# Patient Record
Sex: MALE | Race: WHITE | NOT HISPANIC OR LATINO | URBAN - METROPOLITAN AREA
[De-identification: names, ages, dates, MRNs, and addresses within clinical notes are randomized per-mention and may not be internally consistent; named-entity substitution may affect disease eponyms.]

---

## 2017-06-05 ENCOUNTER — INPATIENT (INPATIENT)
Facility: HOSPITAL | Age: 67
LOS: 3 days | Discharge: HOSPICE | DRG: 871 | End: 2017-06-09
Attending: HOSPITALIST | Admitting: HOSPITALIST
Payer: MEDICARE

## 2017-06-05 VITALS
TEMPERATURE: 98 F | WEIGHT: 130.07 LBS | RESPIRATION RATE: 16 BRPM | OXYGEN SATURATION: 98 % | HEIGHT: 66 IN | HEART RATE: 106 BPM | DIASTOLIC BLOOD PRESSURE: 71 MMHG | SYSTOLIC BLOOD PRESSURE: 100 MMHG

## 2017-06-05 DIAGNOSIS — C24.1 MALIGNANT NEOPLASM OF AMPULLA OF VATER: ICD-10-CM

## 2017-06-05 DIAGNOSIS — A41.9 SEPSIS, UNSPECIFIED ORGANISM: ICD-10-CM

## 2017-06-05 DIAGNOSIS — R53.81 OTHER MALAISE: ICD-10-CM

## 2017-06-05 DIAGNOSIS — N19 UNSPECIFIED KIDNEY FAILURE: ICD-10-CM

## 2017-06-05 DIAGNOSIS — Z51.5 ENCOUNTER FOR PALLIATIVE CARE: ICD-10-CM

## 2017-06-05 DIAGNOSIS — E43 UNSPECIFIED SEVERE PROTEIN-CALORIE MALNUTRITION: ICD-10-CM

## 2017-06-05 LAB
ALBUMIN SERPL ELPH-MCNC: 2.3 G/DL — LOW (ref 3.5–5)
ALP SERPL-CCNC: >1000 U/L — HIGH (ref 40–120)
ALT FLD-CCNC: 102 U/L DA — HIGH (ref 10–60)
ANION GAP SERPL CALC-SCNC: 17 MMOL/L — SIGNIFICANT CHANGE UP (ref 5–17)
APTT BLD: 48.6 SEC — HIGH (ref 27.5–37.4)
AST SERPL-CCNC: 221 U/L — HIGH (ref 10–40)
BILIRUB SERPL-MCNC: 11.2 MG/DL — HIGH (ref 0.2–1.2)
BUN SERPL-MCNC: 78 MG/DL — HIGH (ref 7–18)
CALCIUM SERPL-MCNC: 8.2 MG/DL — LOW (ref 8.4–10.5)
CHLORIDE SERPL-SCNC: 93 MMOL/L — LOW (ref 96–108)
CO2 SERPL-SCNC: 17 MMOL/L — LOW (ref 22–31)
CREAT SERPL-MCNC: 2.7 MG/DL — HIGH (ref 0.5–1.3)
EOSINOPHIL NFR BLD AUTO: 2 % — SIGNIFICANT CHANGE UP (ref 0–6)
GLUCOSE SERPL-MCNC: 109 MG/DL — HIGH (ref 70–99)
HCT VFR BLD CALC: 44.8 % — SIGNIFICANT CHANGE UP (ref 39–50)
HGB BLD-MCNC: 15 G/DL — SIGNIFICANT CHANGE UP (ref 13–17)
INR BLD: 1.84 RATIO — HIGH (ref 0.88–1.16)
LACTATE SERPL-SCNC: 4 MMOL/L — CRITICAL HIGH (ref 0.7–2)
LACTATE SERPL-SCNC: 5.8 MMOL/L — CRITICAL HIGH (ref 0.7–2)
LYMPHOCYTES # BLD AUTO: 4 % — LOW (ref 13–44)
MCHC RBC-ENTMCNC: 29.8 PG — SIGNIFICANT CHANGE UP (ref 27–34)
MCHC RBC-ENTMCNC: 33.4 GM/DL — SIGNIFICANT CHANGE UP (ref 32–36)
MCV RBC AUTO: 89.2 FL — SIGNIFICANT CHANGE UP (ref 80–100)
MONOCYTES NFR BLD AUTO: 9 % — SIGNIFICANT CHANGE UP (ref 2–14)
NEUTROPHILS NFR BLD AUTO: 85 % — HIGH (ref 43–77)
NT-PROBNP SERPL-SCNC: 6058 PG/ML — HIGH (ref 0–125)
PLATELET # BLD AUTO: 68 K/UL — LOW (ref 150–400)
POTASSIUM SERPL-MCNC: 5.5 MMOL/L — HIGH (ref 3.5–5.3)
POTASSIUM SERPL-SCNC: 5.5 MMOL/L — HIGH (ref 3.5–5.3)
PROT SERPL-MCNC: 5.6 G/DL — LOW (ref 6–8.3)
PROTHROM AB SERPL-ACNC: 20.3 SEC — HIGH (ref 9.8–12.7)
RBC # BLD: 5.03 M/UL — SIGNIFICANT CHANGE UP (ref 4.2–5.8)
RBC # FLD: 16.5 % — HIGH (ref 10.3–14.5)
SODIUM SERPL-SCNC: 127 MMOL/L — LOW (ref 135–145)
TROPONIN I SERPL-MCNC: <0.015 NG/ML — SIGNIFICANT CHANGE UP (ref 0–0.04)
WBC # BLD: 24.1 K/UL — HIGH (ref 3.8–10.5)
WBC # FLD AUTO: 24.1 K/UL — HIGH (ref 3.8–10.5)

## 2017-06-05 PROCEDURE — 99223 1ST HOSP IP/OBS HIGH 75: CPT

## 2017-06-05 PROCEDURE — 99291 CRITICAL CARE FIRST HOUR: CPT

## 2017-06-05 PROCEDURE — 99497 ADVNCD CARE PLAN 30 MIN: CPT | Mod: 25

## 2017-06-05 PROCEDURE — 93970 EXTREMITY STUDY: CPT | Mod: 26

## 2017-06-05 PROCEDURE — 71010: CPT | Mod: 26

## 2017-06-05 RX ORDER — SODIUM CHLORIDE 9 MG/ML
500 INJECTION INTRAMUSCULAR; INTRAVENOUS; SUBCUTANEOUS
Qty: 0 | Refills: 0 | Status: COMPLETED | OUTPATIENT
Start: 2017-06-05 | End: 2017-06-05

## 2017-06-05 RX ORDER — SODIUM CHLORIDE 9 MG/ML
1000 INJECTION INTRAMUSCULAR; INTRAVENOUS; SUBCUTANEOUS
Qty: 0 | Refills: 0 | Status: DISCONTINUED | OUTPATIENT
Start: 2017-06-05 | End: 2017-06-06

## 2017-06-05 RX ORDER — LANOLIN ALCOHOL/MO/W.PET/CERES
3 CREAM (GRAM) TOPICAL ONCE
Qty: 0 | Refills: 0 | Status: COMPLETED | OUTPATIENT
Start: 2017-06-05 | End: 2017-06-05

## 2017-06-05 RX ADMIN — SODIUM CHLORIDE 2000 MILLILITER(S): 9 INJECTION INTRAMUSCULAR; INTRAVENOUS; SUBCUTANEOUS at 14:05

## 2017-06-05 RX ADMIN — SODIUM CHLORIDE 2000 MILLILITER(S): 9 INJECTION INTRAMUSCULAR; INTRAVENOUS; SUBCUTANEOUS at 14:04

## 2017-06-05 RX ADMIN — SODIUM CHLORIDE 125 MILLILITER(S): 9 INJECTION INTRAMUSCULAR; INTRAVENOUS; SUBCUTANEOUS at 19:59

## 2017-06-05 RX ADMIN — SODIUM CHLORIDE 2000 MILLILITER(S): 9 INJECTION INTRAMUSCULAR; INTRAVENOUS; SUBCUTANEOUS at 17:00

## 2017-06-05 RX ADMIN — SODIUM CHLORIDE 2000 MILLILITER(S): 9 INJECTION INTRAMUSCULAR; INTRAVENOUS; SUBCUTANEOUS at 17:18

## 2017-06-05 RX ADMIN — SODIUM CHLORIDE 2000 MILLILITER(S): 9 INJECTION INTRAMUSCULAR; INTRAVENOUS; SUBCUTANEOUS at 15:56

## 2017-06-05 NOTE — ED PROVIDER NOTE - PROGRESS NOTE DETAILS
Dr Felice doran.   [phone] Still no answer from Dr Garcia, he is away at a conference. Spoke with patient's friend Mercy [phone]. Patient was told that he has only a couple of months to live. He wanted to die at home, but was a lone and friend could not let him die alone. He was placed on cipro and is supposed to take it daily for the rest of his life but has not taken it in 2 days because he is not eating or drinking anything. Dr Garcia's office called again. Patient is requesting transfer to [place]. NOEL Garcia's nurse sushil and she will call me back. Dr Garcia will not accept transfer as we can provide treatment here. INR is elevated from the patient's illness, labs and imaging noted, will not increase anticoagulation. Patient is DNR and does not want aggressive treatment.

## 2017-06-05 NOTE — CONSULT NOTE ADULT - PROBLEM SELECTOR RECOMMENDATION 6
spoke with HCPs on the phone with Dk who agree with above plan. Pt wants DNR/DNI and no aggressive measures. Very happy with move to [facility]'s IPU placement to be closer to HCPs/friends.

## 2017-06-05 NOTE — CONSULT NOTE ADULT - SUBJECTIVE AND OBJECTIVE BOX
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 Pt reports he has not been eating well, or drinking fluids. Pt notes b/l  LE swelling; pt denies falls or trauma. Pt denies CP, SOB, diaphoresis, palpitations, calf pain, LOC, vomiting, fever, or any other complaints. NKDA. Pt is on Cipro for rest of life (as per PMD instruction): for recent hx of "sepsis". As per HCP/Mercy pt couldn't open bottle for cipro and likely didn't take a few days worth of meds. As per HCP, pt seemed "yellow and shaking" she was very concerned for his safety and any acute medical issues      PAST MEDICAL & SURGICAL HISTORY:  Ampullary carcinoma  No significant past surgical history      SOCIAL HISTORY:  no children  Admitted from:  home  Substance abuse history:  none  Tobacco hx:none      Alcohol hx: none          Home Opioid hx: oxycodone 5mg PRN (not recently taking)  Protestant:   Anglican         Preferred Language: English    HCP Mercy Corrigan [phone] [place] [phone]    FAMILY HISTORY: not pertient    Baseline ADLs (prior to admission): living alone at home, was able to perform all ADLs until recently     No Known Allergies    Present Symptoms:   Dyspnea: mild  Nausea/Vomiting: none   Anxiety: mild  Fatigue: severe  Loss of appetite: severe   Pain: none           Review of Systems: All others negative  MEDICATIONS  (STANDING):  sodium chloride 0.9%. 1000milliLiter(s) IV Continuous <Continuous>    MEDICATIONS  (PRN):      PHYSICAL EXAM:    Vital Signs Last 24 Hrs  T(C): 36.2, Max: 36.7 (06-05 @ 12:30)  T(F): 97.2, Max: 98.1 (06-05 @ 12:30)  HR: 101 (101 - 106)  BP: 94/55 (94/55 - 100/71)  BP(mean): --  RR: 18 (16 - 18)  SpO2: 97% (97% - 98%)    General: alert  oriented x 3    [ mildly distressed cachexia verbal]  Karnofsky Performance Score/Palliative Performance Status Version2: 30 %    HEENT: normal    Lungs : mild tachypnea on nasal cannula oxygen  CV: normal   GI: normal  constipation  last BM: "i don't remember"  : normal  Musculoskeletal: weakness  edema x4          wheelchair bound  Skin: mild jaundice edema  Neuro: no deficits   Oral intake ability:minimal     LABS:                        15.0   24.1  )-----------( 68       ( 05 Jun 2017 13:19 )             44.8     06-05    127<L>  |  93<L>  |  78<H>  ----------------------------<  109<H>  5.5<H>   |  17<L>  |  2.70<H>    Ca    8.2<L>      05 Jun 2017 13:19    TPro  5.6<L>  /  Alb  2.3<L>  /  TBili  11.2<H>  /  DBili  x   /  AST  221<H>  /  ALT  102<H>  /  AlkPhos  >1000<H>  06-05        RADIOLOGY & ADDITIONAL STUDIES: reviewed    ADVANCE DIRECTIVES: DNR/DNI  Advanced Care Planning discussion total time spent:  30min

## 2017-06-05 NOTE — ED PROVIDER NOTE - OBJECTIVE STATEMENT
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 Pt reports he has not been eating well, or drinking fluids. Pt notes b/l  LE swelling; pt denies falls or trauma. Pt denies CP, SOB, diaphoresis, palpitations, calf pain, LOC, vomiting, fever, or any other complaints. NKDA. Pt is on Cipro for rest of life (as per PMD instruction): for radiation secondary to DM. 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 Pt reports he has not been eating well, or drinking fluids. Pt notes b/l  LE swelling; pt denies falls or trauma. Pt denies CP, SOB, diaphoresis, palpitations, calf pain, LOC, vomiting, fever, or any other complaints. NKDA. Pt is on Cipro for rest of life (as per PMD instruction): for radiation.

## 2017-06-05 NOTE — ED ADULT TRIAGE NOTE - CHIEF COMPLAINT QUOTE
per ems, patient friend called and stated that patient became weak and hypotensive in field. per ems, patient BP in 70's in field. 100/71 at triage

## 2017-06-05 NOTE — ED PROVIDER NOTE - NS ED MD SCRIBE ATTENDING SCRIBE SECTIONS
VITAL SIGNS( Pullset)/DISPOSITION/PHYSICAL EXAM/HISTORY OF PRESENT ILLNESS/PAST MEDICAL/SURGICAL/SOCIAL HISTORY/HIV/REVIEW OF SYSTEMS

## 2017-06-06 DIAGNOSIS — A41.9 SEPSIS, UNSPECIFIED ORGANISM: ICD-10-CM

## 2017-06-06 DIAGNOSIS — K81.9 CHOLECYSTITIS, UNSPECIFIED: ICD-10-CM

## 2017-06-06 DIAGNOSIS — J15.9 UNSPECIFIED BACTERIAL PNEUMONIA: ICD-10-CM

## 2017-06-06 LAB
APPEARANCE UR: CLEAR — SIGNIFICANT CHANGE UP
BILIRUB UR-MCNC: ABNORMAL
COLOR SPEC: ABNORMAL
DIFF PNL FLD: ABNORMAL
GLUCOSE UR QL: NEGATIVE — SIGNIFICANT CHANGE UP
KETONES UR-MCNC: ABNORMAL
LEUKOCYTE ESTERASE UR-ACNC: ABNORMAL
NITRITE UR-MCNC: NEGATIVE — SIGNIFICANT CHANGE UP
PH UR: 5 — SIGNIFICANT CHANGE UP (ref 5–8)
PROT UR-MCNC: 15
SP GR SPEC: 1.02 — SIGNIFICANT CHANGE UP (ref 1.01–1.02)
UROBILINOGEN FLD QL: 4

## 2017-06-06 PROCEDURE — 99223 1ST HOSP IP/OBS HIGH 75: CPT | Mod: AI,GC

## 2017-06-06 PROCEDURE — 99233 SBSQ HOSP IP/OBS HIGH 50: CPT

## 2017-06-06 RX ORDER — ACETAMINOPHEN 500 MG
650 TABLET ORAL ONCE
Qty: 0 | Refills: 0 | Status: COMPLETED | OUTPATIENT
Start: 2017-06-06 | End: 2017-06-06

## 2017-06-06 RX ORDER — HYDROMORPHONE HYDROCHLORIDE 2 MG/ML
0.5 INJECTION INTRAMUSCULAR; INTRAVENOUS; SUBCUTANEOUS ONCE
Qty: 0 | Refills: 0 | Status: DISCONTINUED | OUTPATIENT
Start: 2017-06-06 | End: 2017-06-06

## 2017-06-06 RX ORDER — SODIUM CHLORIDE 9 MG/ML
1000 INJECTION INTRAMUSCULAR; INTRAVENOUS; SUBCUTANEOUS
Qty: 0 | Refills: 0 | Status: DISCONTINUED | OUTPATIENT
Start: 2017-06-06 | End: 2017-06-06

## 2017-06-06 RX ORDER — PIPERACILLIN AND TAZOBACTAM 4; .5 G/20ML; G/20ML
3.38 INJECTION, POWDER, LYOPHILIZED, FOR SOLUTION INTRAVENOUS EVERY 12 HOURS
Qty: 0 | Refills: 0 | Status: DISCONTINUED | OUTPATIENT
Start: 2017-06-06 | End: 2017-06-09

## 2017-06-06 RX ORDER — HEPARIN SODIUM 5000 [USP'U]/ML
5000 INJECTION INTRAVENOUS; SUBCUTANEOUS EVERY 8 HOURS
Qty: 0 | Refills: 0 | Status: DISCONTINUED | OUTPATIENT
Start: 2017-06-06 | End: 2017-06-08

## 2017-06-06 RX ORDER — POLYETHYLENE GLYCOL 3350 17 G/17G
17 POWDER, FOR SOLUTION ORAL DAILY
Qty: 0 | Refills: 0 | Status: DISCONTINUED | OUTPATIENT
Start: 2017-06-06 | End: 2017-06-08

## 2017-06-06 RX ORDER — OXYCODONE HYDROCHLORIDE 5 MG/1
5 TABLET ORAL EVERY 12 HOURS
Qty: 0 | Refills: 0 | Status: DISCONTINUED | OUTPATIENT
Start: 2017-06-06 | End: 2017-06-06

## 2017-06-06 RX ORDER — SENNA PLUS 8.6 MG/1
2 TABLET ORAL AT BEDTIME
Qty: 0 | Refills: 0 | Status: DISCONTINUED | OUTPATIENT
Start: 2017-06-06 | End: 2017-06-09

## 2017-06-06 RX ORDER — LACTULOSE 10 G/15ML
20 SOLUTION ORAL ONCE
Qty: 0 | Refills: 0 | Status: COMPLETED | OUTPATIENT
Start: 2017-06-06 | End: 2017-06-06

## 2017-06-06 RX ORDER — MORPHINE SULFATE 50 MG/1
2 CAPSULE, EXTENDED RELEASE ORAL EVERY 6 HOURS
Qty: 0 | Refills: 0 | Status: DISCONTINUED | OUTPATIENT
Start: 2017-06-06 | End: 2017-06-06

## 2017-06-06 RX ORDER — SODIUM CHLORIDE 9 MG/ML
500 INJECTION INTRAMUSCULAR; INTRAVENOUS; SUBCUTANEOUS ONCE
Qty: 0 | Refills: 0 | Status: COMPLETED | OUTPATIENT
Start: 2017-06-06 | End: 2017-06-06

## 2017-06-06 RX ORDER — ALPRAZOLAM 0.25 MG
1 TABLET ORAL EVERY 12 HOURS
Qty: 0 | Refills: 0 | Status: DISCONTINUED | OUTPATIENT
Start: 2017-06-06 | End: 2017-06-09

## 2017-06-06 RX ORDER — PANTOPRAZOLE SODIUM 20 MG/1
40 TABLET, DELAYED RELEASE ORAL
Qty: 0 | Refills: 0 | Status: DISCONTINUED | OUTPATIENT
Start: 2017-06-06 | End: 2017-06-08

## 2017-06-06 RX ORDER — DOCUSATE SODIUM 100 MG
100 CAPSULE ORAL
Qty: 0 | Refills: 0 | Status: DISCONTINUED | OUTPATIENT
Start: 2017-06-06 | End: 2017-06-06

## 2017-06-06 RX ORDER — PIPERACILLIN AND TAZOBACTAM 4; .5 G/20ML; G/20ML
3.38 INJECTION, POWDER, LYOPHILIZED, FOR SOLUTION INTRAVENOUS ONCE
Qty: 0 | Refills: 0 | Status: COMPLETED | OUTPATIENT
Start: 2017-06-06 | End: 2017-06-06

## 2017-06-06 RX ORDER — MORPHINE SULFATE 50 MG/1
2 CAPSULE, EXTENDED RELEASE ORAL ONCE
Qty: 0 | Refills: 0 | Status: DISCONTINUED | OUTPATIENT
Start: 2017-06-06 | End: 2017-06-06

## 2017-06-06 RX ORDER — HYDROMORPHONE HYDROCHLORIDE 2 MG/ML
0.5 INJECTION INTRAMUSCULAR; INTRAVENOUS; SUBCUTANEOUS
Qty: 0 | Refills: 0 | Status: DISCONTINUED | OUTPATIENT
Start: 2017-06-06 | End: 2017-06-09

## 2017-06-06 RX ADMIN — PIPERACILLIN AND TAZOBACTAM 200 GRAM(S): 4; .5 INJECTION, POWDER, LYOPHILIZED, FOR SOLUTION INTRAVENOUS at 17:20

## 2017-06-06 RX ADMIN — SENNA PLUS 2 TABLET(S): 8.6 TABLET ORAL at 21:19

## 2017-06-06 RX ADMIN — PANTOPRAZOLE SODIUM 40 MILLIGRAM(S): 20 TABLET, DELAYED RELEASE ORAL at 06:49

## 2017-06-06 RX ADMIN — Medication 1 MILLIGRAM(S): at 22:32

## 2017-06-06 RX ADMIN — Medication 650 MILLIGRAM(S): at 23:21

## 2017-06-06 RX ADMIN — SODIUM CHLORIDE 125 MILLILITER(S): 9 INJECTION INTRAMUSCULAR; INTRAVENOUS; SUBCUTANEOUS at 00:40

## 2017-06-06 RX ADMIN — SODIUM CHLORIDE 1000 MILLILITER(S): 9 INJECTION INTRAMUSCULAR; INTRAVENOUS; SUBCUTANEOUS at 20:51

## 2017-06-06 RX ADMIN — LACTULOSE 20 GRAM(S): 10 SOLUTION ORAL at 20:51

## 2017-06-06 RX ADMIN — SODIUM CHLORIDE 100 MILLILITER(S): 9 INJECTION INTRAMUSCULAR; INTRAVENOUS; SUBCUTANEOUS at 08:00

## 2017-06-06 RX ADMIN — HEPARIN SODIUM 5000 UNIT(S): 5000 INJECTION INTRAVENOUS; SUBCUTANEOUS at 21:19

## 2017-06-06 RX ADMIN — HEPARIN SODIUM 5000 UNIT(S): 5000 INJECTION INTRAVENOUS; SUBCUTANEOUS at 13:42

## 2017-06-06 RX ADMIN — Medication 3 MILLIGRAM(S): at 00:36

## 2017-06-06 RX ADMIN — PIPERACILLIN AND TAZOBACTAM 25 GRAM(S): 4; .5 INJECTION, POWDER, LYOPHILIZED, FOR SOLUTION INTRAVENOUS at 18:24

## 2017-06-06 NOTE — H&P ADULT - ATTENDING COMMENTS
patient seen and examined at bedside, states good pain control, palliative on board awaiting in hospice placement.  CXR pertinent for bilateral nodular opacities most likely metastatic in light of active ampullary carcinoma end stage.  WBC elevated most likely secondary to cholangitis from obstruction, ID on board recommending Zosyn.  Vitals reviewed,   physical exam: pertinent for generalized edema, distended abdomen with palpable firm mass, lungs are clear except for basal rales bilateral,   bilateral LE edema with left>right.  A/P  1- sepsis secondary to Cholangitis: IV antibiotics, would hold on IV fluids for now in light of hyponatremia.  2- End stage metastatic ampullary carcinoma: pain management, palliative on board, awaiting in patient hospice bed.  3- bilateral nodular opacities in lungs: can be infectious vs metastatic in light of active end stage malignancy: ID input appreciated, continue with zosyn.  4- DVT: INR: 1.8 secondary to liver malfunction from metastatic tumor, would not anticoagulate further.  5- CKD stage 4: avoid nephrotoxics and monitor  6- hyponatremia: secondary to anasarca and compromised liver, fluid restriction except if low BP then will give boluses.  prognosis very poor.  4-   prognosis very poor.

## 2017-06-06 NOTE — CONSULT NOTE ADULT - PROBLEM SELECTOR RECOMMENDATION 2
elevated WBC and hx of continued sepsis/bacteremia, follow up BCx and ID for management. Pt does want attempt in treatment of sepsis with antibiotics
1- Start Zosyn 3.375 gm IVPB q 12 hors x 2 weeks.  2- Vancomycin 1 gm IVPB x 1 dose.  3- Blood cultures.  4- Urine culture.  5- Surgical evaluation.  6- Fluid and electrolytes management.  7- CBC and CMP follow up.

## 2017-06-06 NOTE — ADVANCED PRACTICE NURSE CONSULT - ASSESSMENT
This is a 67yr old male patient admitted for Sepsis, presenting with blanchable erythema to the bilateral Sacrogluteal Area and R. Gluteus

## 2017-06-06 NOTE — ADVANCED PRACTICE NURSE CONSULT - RECOMMEDATIONS
-Elevate/float the patients heels using heel protectors and reposition the patient Q 2hrs using wedges or pillows

## 2017-06-06 NOTE — CONSULT NOTE ADULT - SUBJECTIVE AND OBJECTIVE BOX
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     Patient reports he has not been eating well, or drinking fluids due to poor appetite and weakness. Denies any nausea, vomiting, diarrhea, oral lesions, dysphagia or fever. He has moderate intensity pain his shoulder and back but denies cehst pain, shortness of breath cough,  diaphoresis, palpitations or leg swelling. Patient did not have any new medication or diet. He was on ciprofloxacin for ?bacteremia and missed several doses due to anorexia or as per HCP inability to open the bottle.  He has developed yellowing of skin since last week.   In the ED, patient was tachycardic to 101, hypotensive with SBP 90s, had leucocytosis of 24,000, lactic acidosis, renal failure, transaminitis with alk phos >1000. (2017 06:26)      PAST MEDICAL & SURGICAL HISTORY:  Ampullary carcinoma  No significant past surgical history      No Known Allergies      heparin  Injectable 5000Unit(s) SubCutaneous every 8 hours  pantoprazole    Tablet 40milliGRAM(s) Oral before breakfast  morphine  - Injectable 2milliGRAM(s) IV Push every 6 hours PRN  oxyCODONE ER Tablet 5milliGRAM(s) Oral every 12 hours  ALPRAZolam 1milliGRAM(s) Oral every 12 hours PRN  levoFLOXacin IVPB 500milliGRAM(s) IV Intermittent every 24 hours  sodium chloride 0.9%. 1000milliLiter(s) IV Continuous <Continuous>      Social Hx:    FAMILY HISTORY: none contributory.        ROS  [  ] UNABLE TO ELICIT    General:  [ x ] Poor appetite  [  ] Fever  [  ] Chills  [ x ] Malaise    Skin:  [ x ] None [  ] Rash  [  ] Wound  [  ] Ulcer    HEENT:  [ x ] None  [  ] Sore Throat  [  ] Nasal congestion/ runny nose  [  ] Photophobia [  ] Neck pain      Chest:  [  ] None   [ x ] SOB on exertion.  [ x ] Cough  [  ] None    Cardiovascular:   [ x ] None  [  ] CP  [  ] Palpitation    Gastrointestinal:  [  ] None  [ x ] Abd pain   [  ] Nausea    [  ] Vomiting   [  ] Diarrhea	     Genitourinary:  [ x ] None [  ] Polyuria   [  ] Urgency  [  ] Frequency  [  ] Dysuria    [  ]  Hematuria       Musculoskeletal:  [  ] None [ x ] Back Pain and R shoulder pain	[ x ] Body aches  [  ] Joint pain    Neurological: [  ] None [  ]Dizziness  [  ]Visual Disturbance  [  ]Headaches   [ x ] Weakness      PHYSICAL EXAM:    Vital Signs Last 24 Hrs  T(C): 36.6, Max: 36.6 ( @ 04:35)  T(F): 97.9, Max: 97.9 ( @ 04:35)  HR: 93 (93 - 101)  BP: 103/63 (94/55 - 107/72)  BP(mean): --  RR: 16 (16 - 18)  SpO2: 94% (94% - 100%)    Constitutional:    HEENT: [ x ] Wnl  [  ] Pharyngeal congestion    Neck:  [ x ] Supple  [  ]Lymphadenopathy  [ x ] No JVD   [  ] JVD  [  ] Masses   [x  ] WNL    CHEST/Respiratory:  [  ]Clear to auscultation  [ x ] Rales   [  ] Rhonchi   [  ] Wheezing     [  ] Chest Tenderness      Cardiovascular:  [ x ] Reg S1 S2   [  ] Irreg S1 S2   [ x ]No Murmur  [  ] +ve Murmurs  [  ]Systolic [  ]Diastolic      Abdomen:  [ x ] Soft  [  ] No tendrerness  [ x ] Tenderness RUQ [  ] Organomegaly  [  ] ABD Distention  [  ] Rigidity                       [ x ] No Regidity                       [ x ] No Rebound Tenderness  [ x ] No Guarding Rigidity  [  ] Rebound Tenderness[  ] Guarding Rigidity                          [ x ]  +ve Bowel Sounds  [  ] Decreased Bowel Sounds    [  ] Absent Bowel Sounds                            Extremities: [  ] No edema [ x ] Edema  [  ] Clubbing   [  ] Cyanosis                         [ x ] No Tender Calf muscles  [  ] Tender Calf muscles                        [ x ] Palpable peripheral pulses  Neurological: [ x ] Awake  [ x ] Alert  [ x ] Oriented  x  3                           [  ] Confused  [  ] Drowzy  [  ] repond to painful stimuli  [  ] Unresponsive    Skin:  [ x ] Intact [  ] Redness [  ] Thrombophlebitis  [  ] Rashes  [  ] Dry  [  ] Ulcers    Ortho:  [  ] Joint Swelling  [  ] Joint erythema [  ] Joint tenderness                [  ] Increased temp. to touch  [ x ] DJD       LABS/DIAGNOSTIC TESTS                          15.0   24.1  )-----------( 68       ( 2017 13:19 )             44.8     WBC Count: 24.1 K/uL ( @ 13:19)          127<L>  |  93<L>  |  78<H>  ----------------------------<  109<H>  5.5<H>   |  17<L>  |  2.70<H>    Ca    8.2<L>      2017 13:19    TPro  5.6<L>  /  Alb  2.3<L>  /  TBili  11.2<H>  /  DBili  x   /  AST  221<H>  /  ALT  102<H>  /  AlkPhos  >1000<H>        Urinalysis Basic - ( 2017 04:21 )    Color: Antonella / Appearance: Clear / S.025 / pH: x  Gluc: x / Ketone: Trace  / Bili: Moderate / Urobili: 4   Blood: x / Protein: 15 / Nitrite: Negative   Leuk Esterase: Trace / RBC: 0-2 /HPF / WBC 0-2 /HPF   Sq Epi: x / Non Sq Epi: Few / Bacteria: Few /HPF        LIVER FUNCTIONS - ( 2017 13:19 )  Alb: 2.3 g/dL / Pro: 5.6 g/dL / ALK PHOS: >1000 U/L / ALT: 102 U/L DA / AST: 221 U/L / GGT: x             PT/INR - ( 2017 13:19 )   PT: 20.3 sec;   INR: 1.84 ratio         PTT - ( 2017 13:19 )  PTT:48.6 sec    LACTATE:Lactate, Blood: 4.0 mmol/L ( @ 19:59)  Lactate, Blood: 5.8 mmol/L ( @ 13:21)        CULTURES:   None yet      RADIOLOGY    CXR:     EXAM:  CHEST SINGLE AP OR PA                            PROCEDURE DATE:  2017        INTERPRETATION:  INDICATION: Pneumonia    PRIORS: 2013    VIEWS: Portable AP radiography of the chest performed. Evaluation limited   secondary to shallow inspiration.    FINDINGS: A right-sided IVAD is identified, the distal tip of which   overlies the expected region of the SVC/right atrium. Heart size appears   within normal limits. No superior mediastinal widening is identified.   There are innumerable nodular type opacities identified within the   bilateral lung parenchyma. There is no evidence for right-sided pleural   effusion. A small left pleural effusion is suggested. There is no   evidence for pneumothorax. No mediastinal shift is noted.    IMPRESSION: There are innumerable nodular type opacities identified   within the bilateral lung parenchyma. Differential considerations would   include infectious, inflammatory and neoplastic disease. Correlate   clinically. Radiographic follow-up is advised.    EXAM:  US DPLX LWR EXT VEINS COMPL BI                            PROCEDURE DATE:  2017        INTERPRETATION:  EXAM: US DPLX LWR EXT VEINS COMPL BI   INDICATION: Swelling DVT  COMPARISON: None.    TECHNIQUE: Multiple static images from duplexsonography of the deep   veins of the bilateral lower extremities utilizing color and spectral   Doppler interrogation, with and without compression.       FINDINGS:  The left common and superficial femoral veins proximal aspect   demonstrates nonocclusive DVT. The left popliteal vein is patent.    The right common femoral, superficial femoral, popliteal and visualized   calf veins are normally compressible and demonstrate normal spontaneous   and phasic flow and/or augmentation. There is no evidence of deep vein   thrombosis.    IMPRESSION:  Left common and proximal superficial femoral vein nonocclusive DVT.    Findings were discussed with Henrique Santizo NP on 2017 3:06 PM with   readback.

## 2017-06-06 NOTE — CONSULT NOTE ADULT - PROBLEM SELECTOR RECOMMENDATION 3
medically management conservatively, gentle IV hydration  do not rec aggressive w/u.
1- Start Zosyn 3.375 gm IVPB q 12 hors x 2 weeks.  2- Vancomycin 1 gm IVPB x 1 dose.  3- Blood cultures.  4- Urine culture.  5- Surgical evaluation.  6- Fluid and electrolytes management.  7- CBC and CMP follow up.

## 2017-06-06 NOTE — CONSULT NOTE ADULT - ASSESSMENT
Patient with history of ampullary cancer presented with symptoms and signs suggested for sepsis.  Work up suggest with history and signs for acute cholecystitis, pneumonia and DVT.

## 2017-06-06 NOTE — H&P ADULT - PROBLEM SELECTOR PLAN 3
- Palliative care called and patient has been accepted into hospice pending bed availability at [facility].  - f/u social work  - no blood draws   - Dr Ferreira, palliative

## 2017-06-06 NOTE — CONSULT NOTE ADULT - PROBLEM SELECTOR RECOMMENDATION 9
stage iv, is on [facility] hospice, pt wants to remain on hospice and agrees to go to [facility] but prefers [facility] location. spoke with [facility] who agrees pt qualifies for IPU but currently no bed, maybe tomorrow. please call [phone] (Mariajose or Susan) to follow up about transfer to [facility] hospice-IPU at [facility]. SW consult placed.
1- Start Zosyn 3.375 gm IVPB q 12 hors x 2 weeks.  2- Vancomycin 1 gm IVPB x 1 dose.  3- Blood cultures.  4- Urine culture.  5- Surgical evaluation.  6- Fluid and electrolytes management.  7- CBC and CMP follow up.

## 2017-06-06 NOTE — H&P ADULT - PROBLEM SELECTOR PLAN 1
- secondary to unknown organism bacteremia vs biliary tract infection   - continue levofloxacin  - continue IV fluids for lactic acidosis   - f/u urine and blood cultures

## 2017-06-06 NOTE — H&P ADULT - ASSESSMENT
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     Patient reports he has not been eating well, or drinking fluids due to poor appetite and weakness. Denies any nausea, vomiting, diarrhea, oral lesions, dysphagia or fever. He has moderate intensity pain his shoulder and back but denies cehst pain, shortness of breath cough,  diaphoresis, palpitations or leg swelling. Patient did not have any new medication or diet. He was on ciprofloxacin for ?bacteremia and missed several doses due to anorexia or as per HCP inability to open the bottle.    In the ED, patient was tachycardic to 101, hypotensive with SBP 90s, had leucocytosis of 24,000, lactic acidosis, renal failure, transaminitis with alk phos >1000.

## 2017-06-06 NOTE — H&P ADULT - HISTORY OF PRESENT ILLNESS
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     Patient reports he has not been eating well, or drinking fluids due to poor appetite and weakness. Denies any nausea, vomiting, diarrhea, oral lesions, dysphagia or fever. He has moderate intensity pain his shoulder and back but denies cehst pain, shortness of breath cough,  diaphoresis, palpitations or leg swelling. Patient did not have any new medication or diet. He was on ciprofloxacin for ?bacteremia and missed several doses due to anorexia or as per HCP inability to open the bottle.  He has developed yellowing of skin since last week.   In the ED, patient was tachycardic to 101, hypotensive with SBP 90s, had leucocytosis of 24,000, lactic acidosis, renal failure, transaminitis with alk phos >1000.

## 2017-06-06 NOTE — CONSULT NOTE ADULT - PROBLEM SELECTOR RECOMMENDATION 4
secondary to cancer, supportive management, po diet as tolerated. no artificial nutrition as per pt.
1- Fluid and electrolytes management.  2- CBC and CMP follow up.

## 2017-06-06 NOTE — PROGRESS NOTE ADULT - SUBJECTIVE AND OBJECTIVE BOX
OVERNIGHT EVENTS:    Present Symptoms:     Pain:      present             location: right shoulder  Review of Systems: [All others negative or Unable to obtain due to poor mentation]    MEDICATIONS  (STANDING):  heparin  Injectable 5000Unit(s) SubCutaneous every 8 hours  pantoprazole    Tablet 40milliGRAM(s) Oral before breakfast  oxyCODONE ER Tablet 5milliGRAM(s) Oral every 12 hours  levoFLOXacin IVPB 500milliGRAM(s) IV Intermittent every 24 hours  sodium chloride 0.9%. 1000milliLiter(s) IV Continuous <Continuous>    MEDICATIONS  (PRN):  morphine  - Injectable 2milliGRAM(s) IV Push every 6 hours PRN Severe Pain (7 - 10)  ALPRAZolam 1milliGRAM(s) Oral every 12 hours PRN anxiety/insomnia      PHYSICAL EXAM:  Vital Signs Last 24 Hrs  T(C): 36.6, Max: 36.7 ( @ 12:30)  T(F): 97.9, Max: 98.1 ( @ 12:30)  HR: 93 (93 - 106)  BP: 103/63 (94/55 - 107/72)  BP(mean): --  RR: 16 (16 - 18)  SpO2: 94% (94% - 100%)  General: alert  oriented x ____    [lethargic distressed cachexia  nonverbal  unarousable verbal]  Karnofsky Performance Score/Palliative Performance Status Version2:30    %    HEENT: jaundice  Lungs: clear to auscultation  CV: normal  right chest chemo port intact  GI: normal   : normal   Musculoskeletal: normal   bedbound  Skin: edema +1  Neuro: no deficits   Oral intake ability:  [minimal capability]  Diet: dysphagia    LABS:                          15.0   24.1  )-----------( 68       ( 2017 13:19 )             44.8     06-05    127<L>  |  93<L>  |  78<H>  ----------------------------<  109<H>  5.5<H>   |  17<L>  |  2.70<H>    Ca    8.2<L>      2017 13:19    TPro  5.6<L>  /  Alb  2.3<L>  /  TBili  11.2<H>  /  DBili  x   /  AST  221<H>  /  ALT  102<H>  /  AlkPhos  >1000<H>  06-    Urinalysis Basic - ( 2017 04:21 )    Color: Antonella / Appearance: Clear / S.025 / pH: x  Gluc: x / Ketone: Trace  / Bili: Moderate / Urobili: 4   Blood: x / Protein: 15 / Nitrite: Negative   Leuk Esterase: Trace / RBC: 0-2 /HPF / WBC 0-2 /HPF   Sq Epi: x / Non Sq Epi: Few / Bacteria: Few /HPF        RADIOLOGY & ADDITIONAL STUDIES:    ADVANCE DIRECTIVES:  Advanced Care Planning discussion total time spent: OVERNIGHT EVENTS: more alert today    Present Symptoms:     Pain:      present             location: right shoulder  Review of Systems: All others negative   MEDICATIONS  (STANDING):  heparin  Injectable 5000Unit(s) SubCutaneous every 8 hours  pantoprazole    Tablet 40milliGRAM(s) Oral before breakfast  oxyCODONE ER Tablet 5milliGRAM(s) Oral every 12 hours  levoFLOXacin IVPB 500milliGRAM(s) IV Intermittent every 24 hours  sodium chloride 0.9%. 1000milliLiter(s) IV Continuous <Continuous>    MEDICATIONS  (PRN):  morphine  - Injectable 2milliGRAM(s) IV Push every 6 hours PRN Severe Pain (7 - 10)  ALPRAZolam 1milliGRAM(s) Oral every 12 hours PRN anxiety/insomnia      PHYSICAL EXAM:  Vital Signs Last 24 Hrs  T(C): 36.6, Max: 36.7 ( @ 12:30)  T(F): 97.9, Max: 98.1 ( @ 12:30)  HR: 93 (93 - 106)  BP: 103/63 (94/55 - 107/72)  BP(mean): --  RR: 16 (16 - 18)  SpO2: 94% (94% - 100%)  General: alert  oriented x 3   cachexia   Karnofsky Performance Score/Palliative Performance Status Version2:30    %    HEENT: jaundice  Lungs: clear to auscultation, tachypnea mild  CV: normal  right chest chemo port intact  GI: normal   : normal   Musculoskeletal: bedbound  Skin: edema +1  Neuro: no deficits   Oral intake ability:  [minimal capability]  Diet: dysphagia    LABS:                          15.0   24.1  )-----------( 68       ( 2017 13:19 )             44.8         127<L>  |  93<L>  |  78<H>  ----------------------------<  109<H>  5.5<H>   |  17<L>  |  2.70<H>    Ca    8.2<L>      2017 13:19    TPro  5.6<L>  /  Alb  2.3<L>  /  TBili  11.2<H>  /  DBili  x   /  AST  221<H>  /  ALT  102<H>  /  AlkPhos  >1000<H>      Urinalysis Basic - ( 2017 04:21 )    Color: Antonella / Appearance: Clear / S.025 / pH: x  Gluc: x / Ketone: Trace  / Bili: Moderate / Urobili: 4   Blood: x / Protein: 15 / Nitrite: Negative   Leuk Esterase: Trace / RBC: 0-2 /HPF / WBC 0-2 /HPF   Sq Epi: x / Non Sq Epi: Few / Bacteria: Few /HPF        RADIOLOGY & ADDITIONAL STUDIES: reviewed    ADVANCE DIRECTIVES: DNR/DNI

## 2017-06-07 DIAGNOSIS — E87.1 HYPO-OSMOLALITY AND HYPONATREMIA: ICD-10-CM

## 2017-06-07 DIAGNOSIS — I95.9 HYPOTENSION, UNSPECIFIED: ICD-10-CM

## 2017-06-07 DIAGNOSIS — I82.412 ACUTE EMBOLISM AND THROMBOSIS OF LEFT FEMORAL VEIN: ICD-10-CM

## 2017-06-07 DIAGNOSIS — N17.9 ACUTE KIDNEY FAILURE, UNSPECIFIED: ICD-10-CM

## 2017-06-07 LAB
CULTURE RESULTS: SIGNIFICANT CHANGE UP
SPECIMEN SOURCE: SIGNIFICANT CHANGE UP

## 2017-06-07 PROCEDURE — 99233 SBSQ HOSP IP/OBS HIGH 50: CPT

## 2017-06-07 PROCEDURE — 99233 SBSQ HOSP IP/OBS HIGH 50: CPT | Mod: GC

## 2017-06-07 RX ORDER — MIDODRINE HYDROCHLORIDE 2.5 MG/1
5 TABLET ORAL EVERY 8 HOURS
Qty: 0 | Refills: 0 | Status: DISCONTINUED | OUTPATIENT
Start: 2017-06-07 | End: 2017-06-09

## 2017-06-07 RX ORDER — ALBUMIN HUMAN 25 %
100 VIAL (ML) INTRAVENOUS EVERY 8 HOURS
Qty: 0 | Refills: 0 | Status: COMPLETED | OUTPATIENT
Start: 2017-06-07 | End: 2017-06-08

## 2017-06-07 RX ORDER — SODIUM CHLORIDE 9 MG/ML
1000 INJECTION INTRAMUSCULAR; INTRAVENOUS; SUBCUTANEOUS
Qty: 0 | Refills: 0 | Status: DISCONTINUED | OUTPATIENT
Start: 2017-06-07 | End: 2017-06-07

## 2017-06-07 RX ORDER — LACTULOSE 10 G/15ML
20 SOLUTION ORAL DAILY
Qty: 0 | Refills: 0 | Status: DISCONTINUED | OUTPATIENT
Start: 2017-06-07 | End: 2017-06-09

## 2017-06-07 RX ORDER — SODIUM CHLORIDE 9 MG/ML
1000 INJECTION INTRAMUSCULAR; INTRAVENOUS; SUBCUTANEOUS
Qty: 0 | Refills: 0 | Status: DISCONTINUED | OUTPATIENT
Start: 2017-06-07 | End: 2017-06-09

## 2017-06-07 RX ADMIN — HEPARIN SODIUM 5000 UNIT(S): 5000 INJECTION INTRAVENOUS; SUBCUTANEOUS at 22:24

## 2017-06-07 RX ADMIN — PANTOPRAZOLE SODIUM 40 MILLIGRAM(S): 20 TABLET, DELAYED RELEASE ORAL at 05:12

## 2017-06-07 RX ADMIN — POLYETHYLENE GLYCOL 3350 17 GRAM(S): 17 POWDER, FOR SOLUTION ORAL at 12:05

## 2017-06-07 RX ADMIN — SODIUM CHLORIDE 100 MILLILITER(S): 9 INJECTION INTRAMUSCULAR; INTRAVENOUS; SUBCUTANEOUS at 17:40

## 2017-06-07 RX ADMIN — PIPERACILLIN AND TAZOBACTAM 25 GRAM(S): 4; .5 INJECTION, POWDER, LYOPHILIZED, FOR SOLUTION INTRAVENOUS at 05:12

## 2017-06-07 RX ADMIN — MIDODRINE HYDROCHLORIDE 5 MILLIGRAM(S): 2.5 TABLET ORAL at 22:24

## 2017-06-07 RX ADMIN — Medication 650 MILLIGRAM(S): at 00:21

## 2017-06-07 RX ADMIN — PIPERACILLIN AND TAZOBACTAM 25 GRAM(S): 4; .5 INJECTION, POWDER, LYOPHILIZED, FOR SOLUTION INTRAVENOUS at 17:39

## 2017-06-07 RX ADMIN — LACTULOSE 20 GRAM(S): 10 SOLUTION ORAL at 12:04

## 2017-06-07 RX ADMIN — HEPARIN SODIUM 5000 UNIT(S): 5000 INJECTION INTRAVENOUS; SUBCUTANEOUS at 05:12

## 2017-06-07 RX ADMIN — MIDODRINE HYDROCHLORIDE 5 MILLIGRAM(S): 2.5 TABLET ORAL at 13:47

## 2017-06-07 RX ADMIN — HEPARIN SODIUM 5000 UNIT(S): 5000 INJECTION INTRAVENOUS; SUBCUTANEOUS at 13:47

## 2017-06-07 RX ADMIN — Medication 50 MILLILITER(S): at 22:24

## 2017-06-07 RX ADMIN — SENNA PLUS 2 TABLET(S): 8.6 TABLET ORAL at 22:24

## 2017-06-07 NOTE — DISCHARGE NOTE ADULT - PROVIDER TOKENS
FREE:[LAST:[HOSPICE],FIRST:[CARE],PHONE:[(   )    -],FAX:[(   )    -],ADDRESS:[HOSPICE CARE MEDICAL PROVIDER]]

## 2017-06-07 NOTE — DISCHARGE NOTE ADULT - CARE PLAN
Principal Discharge DX:	Sepsis  Goal:	void of infectious process  Instructions for follow-up, activity and diet:	Continue antibiotics  Secondary Diagnosis:	Cholecystitis  Goal:	pain management  Instructions for follow-up, activity and diet:	continue antibiotics  pain management  Secondary Diagnosis:	ALAN (acute kidney injury)  Instructions for follow-up, activity and diet:	fluids and comfort measures  Secondary Diagnosis:	Ampullary carcinoma  Instructions for follow-up, activity and diet:	comfort measures  Secondary Diagnosis:	Hypotension, unspecified hypotension type  Instructions for follow-up, activity and diet:	take your medications, continue fluids  Secondary Diagnosis:	Renal failure  Instructions for follow-up, activity and diet:	fluids  Secondary Diagnosis:	Palliative care encounter  Instructions for follow-up, activity and diet:	comfort measures  hospice Principal Discharge DX:	Ampullary carcinoma  Goal:	prognosis is poor; hospice care  Instructions for follow-up, activity and diet:	comfort measures  Secondary Diagnosis:	Bacteremia  Goal:	resolution of bacteremia and sepsis  Instructions for follow-up, activity and diet:	continue antibiotics for 5 more days  Secondary Diagnosis:	Cholecystitis  Instructions for follow-up, activity and diet:	fluids and comfort measures  Secondary Diagnosis:	Deep vein thrombosis (DVT) of femoral vein of left lower extremity, unspecified chronicity  Instructions for follow-up, activity and diet:	comfort measures Principal Discharge DX:	Ampullary carcinoma  Goal:	prognosis is poor; hospice care  Instructions for follow-up, activity and diet:	comfort measures  Secondary Diagnosis:	Bacteremia  Goal:	resolution of bacteremia and sepsis  Instructions for follow-up, activity and diet:	continue antibiotics for 7 more days  Secondary Diagnosis:	Cholecystitis  Instructions for follow-up, activity and diet:	fluids and comfort measures  Secondary Diagnosis:	Deep vein thrombosis (DVT) of femoral vein of left lower extremity, unspecified chronicity  Instructions for follow-up, activity and diet:	comfort measures Principal Discharge DX:	Ampullary carcinoma  Goal:	prognosis is poor; hospice care  Instructions for follow-up, activity and diet:	comfort measures  Secondary Diagnosis:	Bacteremia  Goal:	resolution of bacteremia and sepsis  Instructions for follow-up, activity and diet:	Currently not bacteremic and sepsis has resolved; continue antibiotics for 7 more days per ID recommendations and patients wishes as part of GOC  Secondary Diagnosis:	Cholecystitis  Instructions for follow-up, activity and diet:	fluids and comfort measures  Secondary Diagnosis:	Deep vein thrombosis (DVT) of femoral vein of left lower extremity, unspecified chronicity  Instructions for follow-up, activity and diet:	comfort measures

## 2017-06-07 NOTE — DISCHARGE NOTE ADULT - PATIENT PORTAL LINK FT
“You can access the [facility] Patient Portal, offered by [facility], by registering with the following website: [email or web address]”

## 2017-06-07 NOTE — DISCHARGE NOTE ADULT - MEDICATION SUMMARY - MEDICATIONS TO TAKE
I will START or STAY ON the medications listed below when I get home from the hospital:    OxyCONTIN 10 mg oral tablet, extended release  -- 1 tab(s) by mouth every 12 hours  -- Indication: For Cancer related pain    Dilaudid 2 mg oral tablet  -- 1 tab(s) by mouth every 4 hours, As Needed  -- Indication: For Cancer related pain    ALPRAZolam 1 mg oral tablet  -- 1 tab(s) by mouth every 12 hours, As needed, anxiety/insomnia  -- Indication: For Anxiety    lactulose 10 g/15 mL oral syrup  -- 30 milliliter(s) by mouth once a day  -- Indication: For Constipation dur to opioids    senna oral tablet  -- 2 tab(s) by mouth once a day (at bedtime)  -- Indication: For Constipation dur to opioids I will START or STAY ON the medications listed below when I get home from the hospital:    Dilaudid 1 mg/mL injectable solution  -- 0.5 milligram(s) injectable every 4 to 6 hours, As Needed pain or dyspnea  -- Indication: For Cancer related pain    ALPRAZolam 1 mg oral tablet  -- 1 tab(s) by mouth every 12 hours, As needed, anxiety/insomnia  -- Indication: For Anxiety    lactulose 10 g/15 mL oral syrup  -- 30 milliliter(s) by mouth once a day  -- Indication: For Constipation dur to opioids    senna oral tablet  -- 2 tab(s) by mouth once a day (at bedtime)  -- Indication: For Constipation dur to opioids    piperacillin-tazobactam 2 g-0.25 g intravenous injection  --  intravenous   -- Indication: For Bacteremia

## 2017-06-07 NOTE — PROGRESS NOTE ADULT - SUBJECTIVE AND OBJECTIVE BOX
Meds:  piperacillin/tazobactam IVPB. 3.375Gram(s) IV Intermittent every 12 hours    Allergies:  Allergies    No Known Allergies    Intolerances      ROS  [  ] UNABLE TO ELICIT    General:  [ x ] Poor appetite  [  ] Fever  [  ] Chills  [ x ] Malaise    Skin:  [ x ] None [  ] Rash  [  ] Wound  [  ] Ulcer    HEENT:  [ x ] None  [  ] Sore Throat  [  ] Nasal congestion/ runny nose  [  ] Photophobia [  ] Neck pain      Chest:  [  ] None   [ x ] SOB on exertion.  [ x ] Cough  [  ] None    Cardiovascular:   [ x ] None  [  ] CP  [  ] Palpitation    Gastrointestinal:  [  ] None  [ x ] Abd pain   [  ] Nausea    [  ] Vomiting   [  ] Diarrhea	     Genitourinary:  [ x ] None [  ] Polyuria   [  ] Urgency  [  ] Frequency  [  ] Dysuria    [  ]  Hematuria       Musculoskeletal:  [  ] None [ x ] Back Pain and R shoulder pain	[ x ] Body aches  [  ] Joint pain    Neurological: [  ] None [  ]Dizziness  [  ]Visual Disturbance  [  ]Headaches   [ x ] Weakness          PHYSICAL EXAM:    Vital Signs Last 24 Hrs  T(C): 36.2, Max: 36.6 (06-06 @ 14:00)  T(F): 97.2, Max: 97.8 (06-06 @ 14:00)  HR: 94 (94 - 102)  BP: 90/64 (83/54 - 103/71)  BP(mean): --  RR: 16 (16 - 16)  SpO2: 93% (91% - 96%)    Constitutional:    HEENT: [ x ] Wnl  [  ] Pharyngeal congestion    Neck:  [ x ] Supple  [  ]Lymphadenopathy  [ x ] No JVD   [  ] JVD  [  ] Masses   [x  ] WNL    CHEST/Respiratory:  [  ]Clear to auscultation  [ x ] Rales   [  ] Rhonchi   [  ] Wheezing     [  ] Chest Tenderness      Cardiovascular:  [ x ] Reg S1 S2   [  ] Irreg S1 S2   [ x ]No Murmur  [  ] +ve Murmurs  [  ]Systolic [  ]Diastolic      Abdomen:  [ x ] Soft  [  ] No tendrerness  [ x ] Tenderness RUQ [  ] Organomegaly  [  ] ABD Distention  [  ] Rigidity                       [ x ] No Regidity                       [ x ] No Rebound Tenderness  [ x ] No Guarding Rigidity  [  ] Rebound Tenderness[  ] Guarding Rigidity                          [ x ]  +ve Bowel Sounds  [  ] Decreased Bowel Sounds    [  ] Absent Bowel Sounds                            Extremities: [  ] No edema [ x ] Edema  [  ] Clubbing   [  ] Cyanosis                         [ x ] No Tender Calf muscles  [  ] Tender Calf muscles                        [ x ] Palpable peripheral pulses  Neurological: [ x ] Awake  [ x ] Alert  [ x ] Oriented  x  3                           [  ] Confused  [  ] Drowzy  [  ] repond to painful stimuli  [  ] Unresponsive    Skin:  [ x ] Intact [  ] Redness [  ] Thrombophlebitis  [  ] Rashes  [  ] Dry  [  ] Ulcers    Ortho:  [  ] Joint Swelling  [  ] Joint erythema [  ] Joint tenderness                [  ] Increased temp. to touch  [ x ] DJD         LABS/DIAGNOSTIC TESTS                          15.0   24.1  )-----------( 68       ( 05 Jun 2017 13:19 )             44.8         06-05    127<L>  |  93<L>  |  78<H>  ----------------------------<  109<H>  5.5<H>   |  17<L>  |  2.70<H>    Ca    8.2<L>      05 Jun 2017 13:19    TPro  5.6<L>  /  Alb  2.3<L>  /  TBili  11.2<H>  /  DBili  x   /  AST  221<H>  /  ALT  102<H>  /  AlkPhos  >1000<H>  06-05      LIVER FUNCTIONS - ( 05 Jun 2017 13:19 )  Alb: 2.3 g/dL / Pro: 5.6 g/dL / ALK PHOS: >1000 U/L / ALT: 102 U/L DA / AST: 221 U/L / GGT: x               CULTURES:       RADIOLOGY  CXR:      Assessment and Recommendation:   · Assessment		  Patient with history of ampullary cancer presented with symptoms and signs suggested for sepsis.  Work up suggest with history and signs for acute cholecystitis, pneumonia and DVT.  Blood cultures are still negative.  Patient tolerates Zosyn well and feels better, but WBC, electrolytes, and lier enzymes were not repeated yet .    Problem/Recommendation - 1:  Problem: Cholecystitis.   Recommendation:   1- Continue Zosyn 3.375 gm IVPB q 12 hors x 2 weeks total(till6/19/17).  2- S/P Vancomycin 1 gm IVPB x 1 dose on 6/6/17.  3- Follow Blood cultures till final report.  4- Follow Urine culture till final report.  5- Surgical evaluation.  6- Fluid and electrolytes management.  7- CBC and CMP follow up.    Problem/Recommendation - 2:  ·  Problem: Pneumonia, bacterial.    Recommendation:   1- Start Zosyn 3.375 gm IVPB q 12 hors x 2 weeks.  2- S/P Vancomycin 1 gm IVPB x 1 dose on 6/6/17.  3- Blood cultures.  4- Urine culture.  5- Surgical evaluation.  6- Fluid and electrolytes management.  7- CBC and CMP follow up.     Problem/Recommendation - 3:  ·  Problem: Sepsis.    Recommendation:   1- Start Zosyn 3.375 gm IVPB q 12 hors x 2 weeks.  2- S/P Vancomycin 1 gm IVPB x 1 dose on 6/6/17 .  3- Blood cultures.  4- Urine culture.  5- Surgical evaluation.  6- Fluid and electrolytes management.  7- CBC and CMP follow up and management.     Problem/Recommendation - 4:  ·  Problem: Renal failure.    Recommendation:   1- Fluid and electrolytes management.  2- CBC and CMP follow up.     Problem/Recommendation - 5:  ·  Problem: Ampullary carcinoma.    Recommendation:   1- Fluid and electrolytes management.  2- CBC and CMP follow up.  3- Pain management.  4- Oncology management.

## 2017-06-07 NOTE — DISCHARGE NOTE ADULT - PLAN OF CARE
void of infectious process Continue antibiotics continue antibiotics  pain management pain management fluids and comfort measures comfort measures take your medications, continue fluids fluids comfort measures  hospice prognosis is poor; hospice care continue antibiotics for 5 more days resolution of bacteremia and sepsis continue antibiotics for 7 more days Currently not bacteremic and sepsis has resolved; continue antibiotics for 7 more days per ID recommendations and patients wishes as part of GOC

## 2017-06-07 NOTE — DISCHARGE NOTE ADULT - SECONDARY DIAGNOSIS.
Cholecystitis ALAN (acute kidney injury) Ampullary carcinoma Hypotension, unspecified hypotension type Renal failure Palliative care encounter Bacteremia Deep vein thrombosis (DVT) of femoral vein of left lower extremity, unspecified chronicity

## 2017-06-07 NOTE — PROGRESS NOTE ADULT - SUBJECTIVE AND OBJECTIVE BOX
INTERVAL HPI/OVERNIGHT EVENTS:  patient seen and examined at bedside, today he is lethargic and sleepy but responds to verbal stimulation, barely opening eyes.  his blood pressure has been trending down since yesterday after IV fluids were decreased.  Inpatient hospice referral is in process.    Allergies  No Known Allergies    REVIEW OF SYSTEMS:  CONSTITUTIONAL: No fever, weight loss, or fatigue  EYES: No eye pain, visual disturbances, or discharge  RESPIRATORY: No cough, wheezing, chills or hemoptysis; No shortness of breath  CARDIOVASCULAR: No chest pain, palpitations, dizziness, or leg swelling  GASTROINTESTINAL: abdominal pain, No nausea, vomiting, or hematemesis; No diarrhea or constipation. No melena or hematochezia.  GENITOURINARY: No dysuria, frequency, hematuria, or incontinence  NEUROLOGICAL: No headaches, memory loss, loss of strength, numbness, or tremors  MUSCULOSKELETAL: No joint pain or swelling; No muscle, back, or extremity pain  PSYCHIATRIC: No depression, anxiety, mood swings, or difficulty sleeping  HEME/LYMPH: No easy bruising, or bleeding gums  ALLERGY AND IMMUNOLOGIC: No hives or eczema    Medications:  heparin  Injectable 5000Unit(s) SubCutaneous every 8 hours  pantoprazole    Tablet 40milliGRAM(s) Oral before breakfast  ALPRAZolam 1milliGRAM(s) Oral every 12 hours PRN anxiety/insomnia  HYDROmorphone  Injectable 0.5milliGRAM(s) IV Push every 3 hours PRN Severe Pain (7 - 10)  piperacillin/tazobactam IVPB. 3.375Gram(s) IV Intermittent every 12 hours  senna 2Tablet(s) Oral at bedtime  polyethylene glycol 3350 17Gram(s) Oral daily  guaiFENesin/dextromethorphan  Syrup 10milliLiter(s) Oral every 6 hours PRN Cough  lactulose Syrup 20Gram(s) Oral daily  midodrine 5milliGRAM(s) Oral every 8 hours  sodium chloride 0.9%. 1000milliLiter(s) IV Continuous <Continuous>  albumin human 25% IVPB 100milliLiter(s) IV Intermittent every 8 hours      PHYSICAL EXAM:  Vital Signs Last 24 Hrs  T(C): 36.3, Max: 36.3 ( @ 20:20)  T(F): 97.4, Max: 97.4 ( @ 20:20)  HR: 90 (90 - 102)  BP: 86/63 (83/54 - 103/71)  BP(mean): --  RR: 18 (16 - 18)  SpO2: 91% (91% - 93%)    GENERAL: NAD, icteric tinge  HEAD:  Atraumatic, Normocephalic  EYES: EOMI, PERRLA, conjunctiva and sclera clear  ENT: moist mucous membranes  NECK: Supple, No JVD, Normal thyroid  NERVOUS SYSTEM:  lethargic  Motor Strength 5/5 B/L upper and lower extremities; DTRs 2+ intact and symmetric  CHEST/LUNG: No rales, rhonchi, wheezing, or rubs  HEART: Regular rate and rhythm; No murmurs, rubs, or gallops  ABDOMEN: Soft, Nontender, Nondistended; Bowel sounds present  EXTREMITIES:  2+ Peripheral Pulses, No clubbing, cyanosis, or edema  SKIN: No rashes or lesions    LABS:  No labs as patient is comfort care.    Urinalysis Basic - ( 2017 04:21 )    Color: Antonella / Appearance: Clear / S.025 / pH: x  Gluc: x / Ketone: Trace  / Bili: Moderate / Urobili: 4   Blood: x / Protein: 15 / Nitrite: Negative   Leuk Esterase: Trace / RBC: 0-2 /HPF / WBC 0-2 /HPF   Sq Epi: x / Non Sq Epi: Few / Bacteria: Few /HPF      Culture & Sensitivity:   CAPILLARY BLOOD GLUCOSE      I & Os for current day (as of  @ 14:33)  =============================================  IN: 600 ml / OUT: 0 ml / NET: 600 ml      RADIOLOGY & ADDITIONAL TESTS:    Consultant(s) Notes Reviewed:  [ x] YES  [ ] NO    Care Discussed with Consultants/Other Providers [ x] YES  [ ] NO CC: jaundice and failure to thrive    INTERVAL HPI/OVERNIGHT EVENTS:  patient seen and examined at bedside, today he is lethargic and sleepy but responds to verbal stimulation, barely opening eyes.  his blood pressure has been trending down since yesterday after IV fluids were decreased.  Inpatient hospice referral is in process.    Allergies  No Known Allergies    REVIEW OF SYSTEMS:  CONSTITUTIONAL: No fever, weight loss, or fatigue  EYES: No eye pain, visual disturbances, or discharge  RESPIRATORY: No cough, wheezing, chills or hemoptysis; No shortness of breath  CARDIOVASCULAR: No chest pain, palpitations, dizziness, or leg swelling  GASTROINTESTINAL: abdominal pain, No nausea, vomiting, or hematemesis; No diarrhea or constipation. No melena or hematochezia.  GENITOURINARY: No dysuria, frequency, hematuria, or incontinence  NEUROLOGICAL: No headaches, memory loss, loss of strength, numbness, or tremors  MUSCULOSKELETAL: No joint pain or swelling; No muscle, back, or extremity pain  PSYCHIATRIC: No depression, anxiety, mood swings, or difficulty sleeping  HEME/LYMPH: No easy bruising, or bleeding gums  ALLERGY AND IMMUNOLOGIC: No hives or eczema    Medications:  heparin  Injectable 5000Unit(s) SubCutaneous every 8 hours  pantoprazole    Tablet 40milliGRAM(s) Oral before breakfast  ALPRAZolam 1milliGRAM(s) Oral every 12 hours PRN anxiety/insomnia  HYDROmorphone  Injectable 0.5milliGRAM(s) IV Push every 3 hours PRN Severe Pain (7 - 10)  piperacillin/tazobactam IVPB. 3.375Gram(s) IV Intermittent every 12 hours  senna 2Tablet(s) Oral at bedtime  polyethylene glycol 3350 17Gram(s) Oral daily  guaiFENesin/dextromethorphan  Syrup 10milliLiter(s) Oral every 6 hours PRN Cough  lactulose Syrup 20Gram(s) Oral daily  midodrine 5milliGRAM(s) Oral every 8 hours  sodium chloride 0.9%. 1000milliLiter(s) IV Continuous <Continuous>  albumin human 25% IVPB 100milliLiter(s) IV Intermittent every 8 hours      PHYSICAL EXAM:  Vital Signs Last 24 Hrs  T(C): 36.3, Max: 36.3 ( @ 20:20)  T(F): 97.4, Max: 97.4 ( @ 20:20)  HR: 90 (90 - 102)  BP: 86/63 (83/54 - 103/71)  BP(mean): --  RR: 18 (16 - 18)  SpO2: 91% (91% - 93%)    GENERAL: NAD, icteric tinge  HEAD:  Atraumatic, Normocephalic  EYES: EOMI, PERRLA, conjunctiva and sclera clear  ENT: moist mucous membranes  NECK: Supple, No JVD, Normal thyroid  NERVOUS SYSTEM:  lethargic  Motor Strength 5/5 B/L upper and lower extremities; DTRs 2+ intact and symmetric  CHEST/LUNG: No rales, rhonchi, wheezing, or rubs  HEART: Regular rate and rhythm; No murmurs, rubs, or gallops  ABDOMEN: Soft, Nontender, Nondistended; Bowel sounds present  EXTREMITIES:  2+ Peripheral Pulses, No clubbing, cyanosis, or edema  SKIN: No rashes or lesions    LABS:  No labs as patient is comfort care.    Urinalysis Basic - ( 2017 04:21 )    Color: Antonella / Appearance: Clear / S.025 / pH: x  Gluc: x / Ketone: Trace  / Bili: Moderate / Urobili: 4   Blood: x / Protein: 15 / Nitrite: Negative   Leuk Esterase: Trace / RBC: 0-2 /HPF / WBC 0-2 /HPF   Sq Epi: x / Non Sq Epi: Few / Bacteria: Few /HPF      Culture & Sensitivity:   CAPILLARY BLOOD GLUCOSE      I & Os for current day (as of  @ 14:33)  =============================================  IN: 600 ml / OUT: 0 ml / NET: 600 ml      RADIOLOGY & ADDITIONAL TESTS:    Consultant(s) Notes Reviewed:  [ x] YES  [ ] NO    Care Discussed with Consultants/Other Providers [ x] YES  [ ] NO

## 2017-06-07 NOTE — PROGRESS NOTE ADULT - ASSESSMENT
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     Patient reports he has not been eating well, or drinking fluids due to poor appetite and weakness. Denies any nausea, vomiting, diarrhea, oral lesions, dysphagia or fever. He has moderate intensity pain his shoulder and back but denies cehst pain, shortness of breath cough,  diaphoresis, palpitations or leg swelling. Patient did not have any new medication or diet. He was on ciprofloxacin for ?bacteremia and missed several doses due to anorexia or as per HCP inability to open the bottle.    In the ED, patient was tachycardic to 101, hypotensive with SBP 90s, had leucocytosis of 24,000, lactic acidosis, renal failure, transaminitis with alk phos >1000.

## 2017-06-07 NOTE — PROGRESS NOTE ADULT - SUBJECTIVE AND OBJECTIVE BOX
OVERNIGHT EVENTS: patient was hypotensive SBP 80's, received 500 cc Ns bolus. today pain controlled, c/o constipation    Present Symptoms:   Dyspnea: present  Nausea/Vomiting: absent   Anxiety:  mild  Depressed Mood: mild   Fatigue: present  Loss of appetite: present   Pain:                   location:   Review of Systems: [All others negative ]    MEDICATIONS  (STANDING):  heparin  Injectable 5000Unit(s) SubCutaneous every 8 hours  pantoprazole    Tablet 40milliGRAM(s) Oral before breakfast  piperacillin/tazobactam IVPB. 3.375Gram(s) IV Intermittent every 12 hours  senna 2Tablet(s) Oral at bedtime  polyethylene glycol 3350 17Gram(s) Oral daily  sodium chloride 0.9%. 1000milliLiter(s) IV Continuous <Continuous>  lactulose Syrup 20Gram(s) Oral daily    MEDICATIONS  (PRN):  ALPRAZolam 1milliGRAM(s) Oral every 12 hours PRN anxiety/insomnia  HYDROmorphone  Injectable 0.5milliGRAM(s) IV Push every 3 hours PRN Severe Pain (7 - 10)  guaiFENesin/dextromethorphan  Syrup 10milliLiter(s) Oral every 6 hours PRN Cough      PHYSICAL EXAM:  Vital Signs Last 24 Hrs  T(C): 36.2, Max: 36.6 (06-06 @ 14:00)  T(F): 97.2, Max: 97.8 (06-06 @ 14:00)  HR: 94 (94 - 102)  BP: 90/64 (83/54 - 103/71)  BP(mean): --  RR: 16 (16 - 16)  SpO2: 93% (91% - 96%)  General: alert  oriented x _3___    [lethargic distressed cachexia  nonverbal  unarousable verbal]  Karnofsky Performance Score/Palliative Performance Status Version2: 30   %    HEENT: jaundice on 3 L O2 via nasal canula  Lungs: tachypneic, decreased breath sounds right  CV: S1 + s2, right chest chemo port  GI: distended  last BM: today  : normal    Musculoskeletal: normal  edema +1, bedbound  Skin: edema +1  Neuro: no deficits cognitive impairment  Oral intake ability:  [minimal capability]  Diet: puree    LABS:                          15.0   24.1  )-----------( 68       ( 2017 13:19 )             44.8     06-05    127<L>  |  93<L>  |  78<H>  ----------------------------<  109<H>  5.5<H>   |  17<L>  |  2.70<H>    Ca    8.2<L>      2017 13:19    TPro  5.6<L>  /  Alb  2.3<L>  /  TBili  11.2<H>  /  DBili  x   /  AST  221<H>  /  ALT  102<H>  /  AlkPhos  >1000<H>      Urinalysis Basic - ( 2017 04:21 )    Color: Antonella / Appearance: Clear / S.025 / pH: x  Gluc: x / Ketone: Trace  / Bili: Moderate / Urobili: 4   Blood: x / Protein: 15 / Nitrite: Negative   Leuk Esterase: Trace / RBC: 0-2 /HPF / WBC 0-2 /HPF   Sq Epi: x / Non Sq Epi: Few / Bacteria: Few /HPF        RADIOLOGY & ADDITIONAL STUDIES:    ADVANCE DIRECTIVES:  Advanced Care Planning discussion total time spent: OVERNIGHT EVENTS: patient was hypotensive SBP 80's, received 500 cc Ns bolus. today pain controlled, c/o constipation    Present Symptoms:   Dyspnea: present  Nausea/Vomiting: absent   Anxiety:  mild  Depressed Mood: mild   Fatigue: present  Loss of appetite: present   Pain: generalized - mild-mod   Review of Systems: [All others negative ]    MEDICATIONS  (STANDING):  heparin  Injectable 5000Unit(s) SubCutaneous every 8 hours  pantoprazole    Tablet 40milliGRAM(s) Oral before breakfast  piperacillin/tazobactam IVPB. 3.375Gram(s) IV Intermittent every 12 hours  senna 2Tablet(s) Oral at bedtime  polyethylene glycol 3350 17Gram(s) Oral daily  sodium chloride 0.9%. 1000milliLiter(s) IV Continuous <Continuous>  lactulose Syrup 20Gram(s) Oral daily    MEDICATIONS  (PRN):  ALPRAZolam 1milliGRAM(s) Oral every 12 hours PRN anxiety/insomnia  HYDROmorphone  Injectable 0.5milliGRAM(s) IV Push every 3 hours PRN Severe Pain (7 - 10)  guaiFENesin/dextromethorphan  Syrup 10milliLiter(s) Oral every 6 hours PRN Cough      PHYSICAL EXAM:  Vital Signs Last 24 Hrs  T(C): 36.2, Max: 36.6 (06-06 @ 14:00)  T(F): 97.2, Max: 97.8 (06-06 @ 14:00)  HR: 94 (94 - 102)  BP: 90/64 (83/54 - 103/71)  BP(mean): --  RR: 16 (16 - 16)  SpO2: 93% (91% - 96%)  General: alert  oriented x _3___    [lethargic distressed cachexia  nonverbal  unarousable verbal]  Karnofsky Performance Score/Palliative Performance Status Version2: 30   %    HEENT: jaundice on 3 L O2 via nasal canula  Lungs: tachypneic, decreased breath sounds right  CV: S1 + s2, right chest chemo port  GI: distended  last BM: today  : normal    Musculoskeletal: normal  edema +1, bedbound  Skin: edema +1  Neuro:cognitive impairment  Oral intake ability:  [minimal capability]  Diet: puree    LABS:                          15.0   24.1  )-----------( 68       ( 2017 13:19 )             44.8     06-05    127<L>  |  93<L>  |  78<H>  ----------------------------<  109<H>  5.5<H>   |  17<L>  |  2.70<H>    Ca    8.2<L>      2017 13:19    TPro  5.6<L>  /  Alb  2.3<L>  /  TBili  11.2<H>  /  DBili  x   /  AST  221<H>  /  ALT  102<H>  /  AlkPhos  >1000<H>      Urinalysis Basic - ( 2017 04:21 )    Color: Antonella / Appearance: Clear / S.025 / pH: x  Gluc: x / Ketone: Trace  / Bili: Moderate / Urobili: 4   Blood: x / Protein: 15 / Nitrite: Negative   Leuk Esterase: Trace / RBC: 0-2 /HPF / WBC 0-2 /HPF   Sq Epi: x / Non Sq Epi: Few / Bacteria: Few /HPF        RADIOLOGY & ADDITIONAL STUDIES: reviewed    ADVANCE DIRECTIVES: DNR/DNI

## 2017-06-07 NOTE — DISCHARGE NOTE ADULT - MEDICATION SUMMARY - MEDICATIONS TO STOP TAKING
I will STOP taking the medications listed below when I get home from the hospital:    ciprofloxacin  --  by mouth

## 2017-06-07 NOTE — DISCHARGE NOTE ADULT - HOSPITAL COURSE
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     Patient reports he has not been eating well, or drinking fluids due to poor appetite and weakness. Denies any nausea, vomiting, diarrhea, oral lesions, dysphagia or fever. He has moderate intensity pain his shoulder and back but denies cehst pain, shortness of breath cough,  diaphoresis, palpitations or leg swelling. Patient did not have any new medication or diet. He was on ciprofloxacin for ?bacteremia and missed several doses due to anorexia or as per HCP inability to open the bottle.    In the ED, patient was tachycardic to 101, hypotensive with SBP 90s, had leucocytosis of 24,000, lactic acidosis, renal failure, transaminitis with alk phos >1000.        Sepsis.  secondary to unknown organism bacteremia vs biliary tract infection   antibiotic treatment with Zosyn 3.375 gm Q 12 hr x 2 weeks total as per ID Dr Dent  continue IV fluids for lactic acidosis  urine and blood cultures up to date negative    Ampullary carcinoma  patient has stage 4 ampullary cancer not a candidate for medical interventions  palliative care consulted  Hospice and comfort measures    Palliative care encounter.   Palliative care called Dr Ferreira and patient has been accepted into hospice pending bed availability at [facility].  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     Patient reports he has not been eating well, or drinking fluids due to poor appetite and weakness. Denies any nausea, vomiting, diarrhea, oral lesions, dysphagia or fever. He has moderate intensity pain his shoulder and back but denies cehst pain, shortness of breath cough,  diaphoresis, palpitations or leg swelling. Patient did not have any new medication or diet. He was on ciprofloxacin for ?bacteremia and missed several doses due to anorexia or as per HCP inability to open the bottle.    In the ED, patient was tachycardic to 101, hypotensive with SBP 90s, had leucocytosis of 24,000, lactic acidosis, renal failure, transaminitis with alk phos >1000.        Sepsis.  secondary to unknown organism bacteremia vs biliary tract infection   antibiotic treatment with Zosyn 3.375 gm Q 12 hr x 2 weeks total as per ID Dr Dent  continue IV fluids for lactic acidosis  urine and blood cultures up to date negative    Ampullary carcinoma  patient has stage 4 ampullary cancer not a candidate for medical interventions  palliative care consulted  Hospice and comfort measures    Palliative care encounter.   Palliative care called Dr Ferreira and patient has been accepted into hospice pending bed availability at [facility].    ATTENDING ADDENDUM:  Plan for discharge to in patient hospice today. 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     Patient reports he has not been eating well, or drinking fluids due to poor appetite and weakness. Denies any nausea, vomiting, diarrhea, oral lesions, dysphagia or fever. He has moderate intensity pain his shoulder and back but denies cehst pain, shortness of breath cough,  diaphoresis, palpitations or leg swelling. Patient did not have any new medication or diet. He was on ciprofloxacin for ?bacteremia and missed several doses due to anorexia or as per HCP inability to open the bottle.    In the ED, patient was tachycardic to 101, hypotensive with SBP 90s, had leucocytosis of 24,000, lactic acidosis, renal failure, transaminitis with alk phos >1000.        Sepsis.- due to bilary tract infection related to ampullary cancer- treated with IV antibiotics was on IV Vancomycin till 6/6/17; currently on IV Zosyn- 14 dyas total recommended by ID- needs 7 more days to complete  secondary to unknown organism bacteremia vs biliary tract infection   antibiotic treatment with Zosyn 3.375 gm Q 12 hr x 2 weeks total as per ID Dr Mejia      Ampullary carcinoma- patient has stage 4 ampullary cancer not a candidate for medical interventions  palliative care consulted; Hospice and comfort measures    Patient has been accepted into in patient  hospice based on his wishes for comfort at end of life.  Overall prognosis is poor.    ATTENDING ADDENDUM:  Plan for discharge to in patient hospice today.

## 2017-06-08 RX ADMIN — PANTOPRAZOLE SODIUM 40 MILLIGRAM(S): 20 TABLET, DELAYED RELEASE ORAL at 05:58

## 2017-06-08 RX ADMIN — MIDODRINE HYDROCHLORIDE 5 MILLIGRAM(S): 2.5 TABLET ORAL at 05:57

## 2017-06-08 RX ADMIN — HEPARIN SODIUM 5000 UNIT(S): 5000 INJECTION INTRAVENOUS; SUBCUTANEOUS at 14:53

## 2017-06-08 RX ADMIN — Medication 1 MILLIGRAM(S): at 17:25

## 2017-06-08 RX ADMIN — HEPARIN SODIUM 5000 UNIT(S): 5000 INJECTION INTRAVENOUS; SUBCUTANEOUS at 05:57

## 2017-06-08 RX ADMIN — MIDODRINE HYDROCHLORIDE 5 MILLIGRAM(S): 2.5 TABLET ORAL at 21:43

## 2017-06-08 RX ADMIN — SENNA PLUS 2 TABLET(S): 8.6 TABLET ORAL at 21:43

## 2017-06-08 RX ADMIN — LACTULOSE 20 GRAM(S): 10 SOLUTION ORAL at 12:06

## 2017-06-08 RX ADMIN — PIPERACILLIN AND TAZOBACTAM 25 GRAM(S): 4; .5 INJECTION, POWDER, LYOPHILIZED, FOR SOLUTION INTRAVENOUS at 05:58

## 2017-06-08 RX ADMIN — Medication 50 MILLILITER(S): at 14:54

## 2017-06-08 RX ADMIN — PIPERACILLIN AND TAZOBACTAM 25 GRAM(S): 4; .5 INJECTION, POWDER, LYOPHILIZED, FOR SOLUTION INTRAVENOUS at 17:20

## 2017-06-08 RX ADMIN — Medication 50 MILLILITER(S): at 05:58

## 2017-06-08 RX ADMIN — MIDODRINE HYDROCHLORIDE 5 MILLIGRAM(S): 2.5 TABLET ORAL at 14:54

## 2017-06-08 RX ADMIN — POLYETHYLENE GLYCOL 3350 17 GRAM(S): 17 POWDER, FOR SOLUTION ORAL at 12:06

## 2017-06-08 NOTE — PROGRESS NOTE ADULT - PROBLEM SELECTOR PLAN 5
Patient's kps is getting worse, unsafe at home at this time, needs 24 hr care for ADL care.
due to underlying malignancy ; also with liver failure and coagulopathy;  prognosis is poor.
Patient's kps is getting worse, unsafe at home at this time, needs 24 hr care for ADL care.
inr 1.8 secondary to compromised liver functions from mets.  would hold on any further anticoagulation.

## 2017-06-08 NOTE — PROGRESS NOTE ADULT - PROBLEM SELECTOR PLAN 6
Patient is DNR/DNI, wants no aggressive measures  Patient to be discharged to [place] [facility]. Referral made, paperwork pending
most likely secondary to decreased intravascular volume  Poor prognosis   Multiorgan failure imminent
Patient is DNR/DNI, wants no aggressive measures  Patient to be discharged to [place] [facility]. Referral made by SHAUNNA today
most likely secondary to decreased intravascular volume  continue IV fluids for now

## 2017-06-08 NOTE — PROGRESS NOTE ADULT - SUBJECTIVE AND OBJECTIVE BOX
Patient is a 67y old  Male who presents with a chief complaint of loss of appetite and weakness (07 Jun 2017 16:24)      INTERVAL HPI/OVERNIGHT EVENTS: no new complaints    MEDICATIONS  (STANDING):  piperacillin/tazobactam IVPB. 3.375Gram(s) IV Intermittent every 12 hours  senna 2Tablet(s) Oral at bedtime  lactulose Syrup 20Gram(s) Oral daily  midodrine 5milliGRAM(s) Oral every 8 hours  sodium chloride 0.9%. 1000milliLiter(s) IV Continuous <Continuous>    MEDICATIONS  (PRN):  ALPRAZolam 1milliGRAM(s) Oral every 12 hours PRN anxiety/insomnia  HYDROmorphone  Injectable 0.5milliGRAM(s) IV Push every 3 hours PRN Severe Pain (7 - 10)  guaiFENesin/dextromethorphan  Syrup 10milliLiter(s) Oral every 6 hours PRN Cough      Allergies    No Known Allergies    Intolerances      __________________________________________________  REVIEW OF SYSTEMS:- unable to participate in ROS due to lethargy    Vital Signs Last 24 Hrs  T(C): 36.4, Max: 37.1 (06-07 @ 21:00)  T(F): 97.6, Max: 98.7 (06-07 @ 21:00)  HR: 88 (88 - 93)  BP: 86/67 (86/67 - 96/51)  BP(mean): --  RR: 16 (16 - 18)  SpO2: 97% (90% - 97%)    ________________________________________________  PHYSICAL EXAM:  GENERAL: NAD, appears comfortable with no signs of respiratory distress  HEAD:  , Normocephalic  EYES:  conjunctiva and sclera clear  NECK: Supple, No JVD    NERVOUS SYSTEM:  lethargic   CHEST/LUNG: fair bilateral air entry  HEART: S1 S2+;   ABDOMEN: Soft, Nontender, Nondistended; Bowel sounds present  EXTREMITIES: no cyanosis; edema+++ bilaterally  _________________________________________________  LABS:   no new labs            CAPILLARY BLOOD GLUCOSE

## 2017-06-08 NOTE — PROGRESS NOTE ADULT - ASSESSMENT
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

## 2017-06-08 NOTE — PROGRESS NOTE ADULT - SUBJECTIVE AND OBJECTIVE BOX
Meds:  piperacillin/tazobactam IVPB. 3.375Gram(s) IV Intermittent every 12 hours    Allergies:  Allergies    No Known Allergies    Intolerances      ROS  [  ] UNABLE TO ELICIT    General:  [ x ] Poor appetite  [  ] Fever  [  ] Chills  [ x ] Malaise    Skin:  [ x ] None [  ] Rash  [  ] Wound  [  ] Ulcer    HEENT:  [ x ] None  [  ] Sore Throat  [  ] Nasal congestion/ runny nose  [  ] Photophobia [  ] Neck pain      Chest:  [  ] None   [ x ] SOB on exertion.  [ x ] Cough  [  ] None    Cardiovascular:   [ x ] None  [  ] CP  [  ] Palpitation    Gastrointestinal:  [  ] None  [ x ] Abd pain   [  ] Nausea    [  ] Vomiting   [  ] Diarrhea	     Genitourinary:  [ x ] None [  ] Polyuria   [  ] Urgency  [  ] Frequency  [  ] Dysuria    [  ]  Hematuria       Musculoskeletal:  [  ] None [ x ] Back Pain and R shoulder pain	[ x ] Body aches  [  ] Joint pain    Neurological: [  ] None [  ]Dizziness  [  ]Visual Disturbance  [  ]Headaches   [ x ] Weakness          PHYSICAL EXAM:    Vital Signs Last 24 Hrs  T(C): 36.6, Max: 37.1 (06-07 @ 21:00)  T(F): 97.9, Max: 98.7 (06-07 @ 21:00)  HR: 91 (90 - 93)  BP: 96/51 (86/63 - 96/51)  BP(mean): --  RR: 18 (16 - 18)  SpO2: 90% (90% - 94%)    Constitutional:    HEENT: [ x ] Wnl  [  ] Pharyngeal congestion    Neck:  [ x ] Supple  [  ]Lymphadenopathy  [ x ] No JVD   [  ] JVD  [  ] Masses   [x  ] WNL    CHEST/Respiratory:  [  ]Clear to auscultation  [ x ] Rales   [  ] Rhonchi   [  ] Wheezing     [  ] Chest Tenderness      Cardiovascular:  [ x ] Reg S1 S2   [  ] Irreg S1 S2   [ x ]No Murmur  [  ] +ve Murmurs  [  ]Systolic [  ]Diastolic      Abdomen:  [ x ] Soft  [  ] No tendrerness  [ x ] Tenderness RUQ [  ] Organomegaly  [  ] ABD Distention  [  ] Rigidity                       [ x ] No Regidity                       [ x ] No Rebound Tenderness  [ x ] No Guarding Rigidity  [  ] Rebound Tenderness[  ] Guarding Rigidity                          [ x ]  +ve Bowel Sounds  [  ] Decreased Bowel Sounds    [  ] Absent Bowel Sounds                            Extremities: [  ] No edema [ x ] Edema  [  ] Clubbing   [  ] Cyanosis                         [ x ] No Tender Calf muscles  [  ] Tender Calf muscles                        [ x ] Palpable peripheral pulses  Neurological: [ x ] Awake  [ x ] Alert  [ x ] Oriented  x  3                           [  ] Confused  [  ] Drowzy  [  ] repond to painful stimuli  [  ] Unresponsive    Skin:  [ x ] Intact [ x ] Jaundice [  ] Thrombophlebitis  [  ] Rashes  [  ] Dry  [  ] Ulcers    Ortho:  [  ] Joint Swelling  [  ] Joint erythema [  ] Joint tenderness                [  ] Increased temp. to touch  [ x ] DJD      LABS/DIAGNOSTIC TESTS                          15.0   24.1  )-----------( 68       ( 05 Jun 2017 13:19 )             44.8         06-05    127<L>  |  93<L>  |  78<H>  ----------------------------<  109<H>  5.5<H>   |  17<L>  |  2.70<H>    Ca    8.2<L>      05 Jun 2017 13:19    TPro  5.6<L>  /  Alb  2.3<L>  /  TBili  11.2<H>  /  DBili  x   /  AST  221<H>  /  ALT  102<H>  /  AlkPhos  >1000<H>  06-05      LIVER FUNCTIONS - ( 05 Jun 2017 13:19 )  Alb: 2.3 g/dL / Pro: 5.6 g/dL / ALK PHOS: >1000 U/L / ALT: 102 U/L DA / AST: 221 U/L / GGT: x               CULTURES:   Culture - Urine (06.06.17 @ 09:50)    Specimen Source: .Urine Clean Catch (Midstream)    Culture Results:   <10,000 CFU/ml  Normal Urogenital corrina present    Culture - Blood (06.05.17 @ 18:08)    Specimen Source: .Blood Blood-Peripheral    Culture Results:   No growth to date.    Culture - Blood (06.05.17 @ 18:08)    Specimen Source: .Blood Blood-Peripheral    Culture Results:   No growth to date.        RADIOLOGY  CXR:   EXAM:  CHEST SINGLE AP OR PA                            PROCEDURE DATE:  06/05/2017        INTERPRETATION:  INDICATION: Pneumonia    PRIORS: December 2, 2013    VIEWS: Portable AP radiography of the chest performed. Evaluation limited   secondary to shallow inspiration.    FINDINGS: A right-sided IVAD is identified, the distal tip of which   overlies the expected region of the SVC/right atrium. Heart size appears   within normal limits. No superior mediastinal widening is identified.   There are innumerable nodular type opacities identified within the   bilateral lung parenchyma. There is no evidence for right-sided pleural   effusion. A small left pleural effusion is suggested. There is no   evidence for pneumothorax. No mediastinal shift is noted.    IMPRESSION: There are innumerable nodular type opacities identified   within the bilateral lung parenchyma. Differential considerations would   include infectious, inflammatory and neoplastic disease. Correlate   clinically. Radiographic follow-up is advised.      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  Work up suggest with history and signs for acute cholecystitis, pneumonia and DVT.  Blood cultures are still negative.  Patient tolerates Zosyn well and feels better, but WBC, electrolytes, and liver enzymes were not repeated yet .    Problem/Recommendation - 1:  Problem: Cholecystitis.   Recommendation:   1- Continue Zosyn 3.375 gm IVPB q 12 hors x 2 weeks total(till6/19/17).  2- S/P Vancomycin 1 gm IVPB x 1 dose on 6/6/17.  3- Follow Blood cultures till final report.  4- Follow Urine culture till final report.  5- Surgical evaluation.  6- Fluid and electrolytes management.  7- CBC and CMP follow up.    Problem/Recommendation - 2:  ·  Problem: Pneumonia, bacterial.    Recommendation:   1- Start Zosyn 3.375 gm IVPB q 12 hors x 2 weeks.  2- S/P Vancomycin 1 gm IVPB x 1 dose on 6/6/17.  3- Blood cultures.  4- Urine culture.  5- Surgical evaluation.  6- Fluid and electrolytes management.  7- CBC and CMP follow up.     Problem/Recommendation - 3:  ·  Problem: Sepsis.    Recommendation:   1- Start Zosyn 3.375 gm IVPB q 12 hors x 2 weeks.  2- S/P Vancomycin 1 gm IVPB x 1 dose on 6/6/17 .  3- Blood cultures.  4- Urine culture.  5- Surgical evaluation.  6- Fluid and electrolytes management.  7- CBC and CMP follow up and management.     Problem/Recommendation - 4:  ·  Problem: Renal failure.    Recommendation:   1- Fluid and electrolytes management.  2- CBC and CMP follow up.     Problem/Recommendation - 5:  ·  Problem: Ampullary carcinoma.    Recommendation:   1- Fluid and electrolytes management.  2- CBC and CMP follow up.  3- Pain management.  4- Oncology management.

## 2017-06-08 NOTE — PROGRESS NOTE ADULT - PROBLEM SELECTOR PROBLEM 6
Palliative care encounter
Palliative care encounter
ALAN (acute kidney injury)
ALAN (acute kidney injury)

## 2017-06-08 NOTE — PROGRESS NOTE ADULT - PROBLEM SELECTOR PROBLEM 5
Debility
Debility
Deep vein thrombosis (DVT) of femoral vein of left lower extremity, unspecified chronicity
Deep vein thrombosis (DVT) of femoral vein of left lower extremity, unspecified chronicity

## 2017-06-09 VITALS
OXYGEN SATURATION: 96 % | TEMPERATURE: 95 F | HEART RATE: 95 BPM | DIASTOLIC BLOOD PRESSURE: 61 MMHG | SYSTOLIC BLOOD PRESSURE: 91 MMHG | RESPIRATION RATE: 16 BRPM

## 2017-06-09 PROCEDURE — 85027 COMPLETE CBC AUTOMATED: CPT

## 2017-06-09 PROCEDURE — 83880 ASSAY OF NATRIURETIC PEPTIDE: CPT

## 2017-06-09 PROCEDURE — P9047: CPT

## 2017-06-09 PROCEDURE — 99291 CRITICAL CARE FIRST HOUR: CPT | Mod: 25

## 2017-06-09 PROCEDURE — 87086 URINE CULTURE/COLONY COUNT: CPT

## 2017-06-09 PROCEDURE — 81001 URINALYSIS AUTO W/SCOPE: CPT

## 2017-06-09 PROCEDURE — 80053 COMPREHEN METABOLIC PANEL: CPT

## 2017-06-09 PROCEDURE — 85730 THROMBOPLASTIN TIME PARTIAL: CPT

## 2017-06-09 PROCEDURE — 84484 ASSAY OF TROPONIN QUANT: CPT

## 2017-06-09 PROCEDURE — 87040 BLOOD CULTURE FOR BACTERIA: CPT

## 2017-06-09 PROCEDURE — 85610 PROTHROMBIN TIME: CPT

## 2017-06-09 PROCEDURE — 93970 EXTREMITY STUDY: CPT

## 2017-06-09 PROCEDURE — 93005 ELECTROCARDIOGRAM TRACING: CPT

## 2017-06-09 PROCEDURE — 71045 X-RAY EXAM CHEST 1 VIEW: CPT

## 2017-06-09 PROCEDURE — 83605 ASSAY OF LACTIC ACID: CPT

## 2017-06-09 PROCEDURE — 96374 THER/PROPH/DIAG INJ IV PUSH: CPT

## 2017-06-09 RX ORDER — ALPRAZOLAM 0.25 MG
0 TABLET ORAL
Qty: 0 | Refills: 0 | COMMUNITY

## 2017-06-09 RX ORDER — LACTULOSE 10 G/15ML
30 SOLUTION ORAL
Qty: 0 | Refills: 0 | COMMUNITY
Start: 2017-06-09

## 2017-06-09 RX ORDER — ALPRAZOLAM 0.25 MG
1 TABLET ORAL
Qty: 0 | Refills: 0 | COMMUNITY
Start: 2017-06-09

## 2017-06-09 RX ORDER — OXYCODONE HYDROCHLORIDE 5 MG/1
1 TABLET ORAL
Qty: 0 | Refills: 0 | COMMUNITY

## 2017-06-09 RX ORDER — OXYCODONE HYDROCHLORIDE 5 MG/1
0 TABLET ORAL
Qty: 0 | Refills: 0 | COMMUNITY

## 2017-06-09 RX ORDER — PIPERACILLIN AND TAZOBACTAM 4; .5 G/20ML; G/20ML
0 INJECTION, POWDER, LYOPHILIZED, FOR SOLUTION INTRAVENOUS
Qty: 0 | Refills: 0 | COMMUNITY
Start: 2017-06-09

## 2017-06-09 RX ORDER — SENNA PLUS 8.6 MG/1
2 TABLET ORAL
Qty: 0 | Refills: 0 | COMMUNITY
Start: 2017-06-09

## 2017-06-09 RX ORDER — HYDROMORPHONE HYDROCHLORIDE 2 MG/ML
0.5 INJECTION INTRAMUSCULAR; INTRAVENOUS; SUBCUTANEOUS
Qty: 0 | Refills: 0 | COMMUNITY

## 2017-06-09 RX ORDER — CIPROFLOXACIN LACTATE 400MG/40ML
0 VIAL (ML) INTRAVENOUS
Qty: 0 | Refills: 0 | COMMUNITY

## 2017-06-09 RX ORDER — PIPERACILLIN AND TAZOBACTAM 4; .5 G/20ML; G/20ML
0 INJECTION, POWDER, LYOPHILIZED, FOR SOLUTION INTRAVENOUS
Qty: 0 | Refills: 0 | COMMUNITY

## 2017-06-09 RX ORDER — HYDROMORPHONE HYDROCHLORIDE 2 MG/ML
1 INJECTION INTRAMUSCULAR; INTRAVENOUS; SUBCUTANEOUS
Qty: 0 | Refills: 0 | COMMUNITY

## 2017-06-09 RX ORDER — PANTOPRAZOLE SODIUM 20 MG/1
0 TABLET, DELAYED RELEASE ORAL
Qty: 0 | Refills: 0 | COMMUNITY

## 2017-06-09 RX ADMIN — HYDROMORPHONE HYDROCHLORIDE 0.5 MILLIGRAM(S): 2 INJECTION INTRAMUSCULAR; INTRAVENOUS; SUBCUTANEOUS at 12:58

## 2017-06-09 RX ADMIN — SODIUM CHLORIDE 100 MILLILITER(S): 9 INJECTION INTRAMUSCULAR; INTRAVENOUS; SUBCUTANEOUS at 06:25

## 2017-06-09 RX ADMIN — HYDROMORPHONE HYDROCHLORIDE 0.5 MILLIGRAM(S): 2 INJECTION INTRAMUSCULAR; INTRAVENOUS; SUBCUTANEOUS at 13:16

## 2017-06-09 RX ADMIN — PIPERACILLIN AND TAZOBACTAM 25 GRAM(S): 4; .5 INJECTION, POWDER, LYOPHILIZED, FOR SOLUTION INTRAVENOUS at 06:25

## 2017-06-09 RX ADMIN — MIDODRINE HYDROCHLORIDE 5 MILLIGRAM(S): 2.5 TABLET ORAL at 06:25

## 2017-06-09 NOTE — PROGRESS NOTE ADULT - PROBLEM SELECTOR PLAN 3
No labs, c/w IV hydration, rate decreased yesterday but patient received 500 cc bolus  overnight for hypotension
patient has stage 4 ampullary cancer not a candidate for surgical interventions.  Patient will benefit from inpatient hospice for pain management and antibiotics standing.
prognosis is poor.  Comfort measures
resolving sepsis.  Will consider stopping antibiotics as patient going to inpatient hospice- discussed with palliative attending- based on patients GOC, he wishes to continue antibiotics. Per ID , continue antibiotics till 6/19/17.  needs about 7 more days of IV Zosyn for sepsis with recent bacteremia likely due to underlying ampullary/ biliary disease
No labs, c/w IV hydration

## 2017-06-09 NOTE — PROGRESS NOTE ADULT - PROBLEM SELECTOR PROBLEM 1
Ampullary carcinoma
Hypotension, unspecified hypotension type

## 2017-06-09 NOTE — PROGRESS NOTE ADULT - PROBLEM SELECTOR PLAN 4
Secondary to cancer, supportive management, po diet as tolerated.
most likely secondary to decreased intravascular volume  Poor prognosis   Multiorgan failure imminent
resolving sepsis.  Will consider stopping antibiotics as patient going to inpatient hospice
Recommendation: secondary to cancer, supportive management, po diet as tolerated.
- Palliative care consult appreciated,  - inpatient hospice is in process  - f/u social work  - no blood draws

## 2017-06-09 NOTE — PROGRESS NOTE ADULT - SUBJECTIVE AND OBJECTIVE BOX
Patient is a 67y old  Male who presents with a chief complaint of loss of appetite and weakness (07 Jun 2017 16:24)      INTERVAL HPI/OVERNIGHT EVENTS: no new complaints    MEDICATIONS  (STANDING):  piperacillin/tazobactam IVPB. 3.375Gram(s) IV Intermittent every 12 hours  senna 2Tablet(s) Oral at bedtime  lactulose Syrup 20Gram(s) Oral daily  midodrine 5milliGRAM(s) Oral every 8 hours  sodium chloride 0.9%. 1000milliLiter(s) IV Continuous <Continuous>    MEDICATIONS  (PRN):  ALPRAZolam 1milliGRAM(s) Oral every 12 hours PRN anxiety/insomnia  HYDROmorphone  Injectable 0.5milliGRAM(s) IV Push every 3 hours PRN Severe Pain (7 - 10)  guaiFENesin/dextromethorphan  Syrup 10milliLiter(s) Oral every 6 hours PRN Cough      Allergies    No Known Allergies    Intolerances      __________________________________________________  REVIEW OF SYSTEMS: patient unable to participate in ROS due to lethargy        Vital Signs Last 24 Hrs  T(C): 35, Max: 36.2 (06-08 @ 21:02)  T(F): 95, Max: 97.1 (06-08 @ 21:02)  HR: 95 (95 - 95)  BP: 91/61 (86/61 - 91/61)  BP(mean): --  RR: 16 (16 - 18)  SpO2: 96% (93% - 96%)    ________________________________________________  PHYSICAL EXAM:  GENERAL: mild respiratory distress noted  HEAD:  , Normocephalic  EYES:  conjunctiva and sclera yellow  NECK: Supple, No JVD    NERVOUS SYSTEM: Lethargic   CHEST/LUNG: fair bilateral air entry   HEART: S1 S2+;   ABDOMEN: Soft, Nontender,  Bowel sounds present  EXTREMITIES: no cyanosis; +++edema bilaterally    _________________________________________________  LABS:              CAPILLARY BLOOD GLUCOSE

## 2017-06-09 NOTE — PROGRESS NOTE ADULT - PROBLEM SELECTOR PROBLEM 4
Severe protein-calorie malnutrition
Severe protein-calorie malnutrition
ALAN (acute kidney injury)
Palliative care encounter
Sepsis

## 2017-06-09 NOTE — PROGRESS NOTE ADULT - PROVIDER SPECIALTY LIST ADULT
Infectious Disease
Internal Medicine
Palliative Care
Palliative Care

## 2017-06-09 NOTE — PROGRESS NOTE ADULT - PROBLEM SELECTOR PLAN 1
Stage 4, was on [facility] home hosp[facility], pt wants to remain on hospice and agrees to go to [facility] inpatient hospice but prefers [place] location. Referral made by SW, paperwork to be completed   c/w dilaudid 0.5 mg q4 prn iv, oxygen supplementation prn
multifactorial: sepsis and decreased oral intake, iv fluids had been stopped yesterday.   midodrine q 8 hours  continue IV antibiotics  add albumin,   increase IV fluids to 100 ml/hour.
stage 4 ampullary cancer- not a candidate for surgical interventions.  Overall prognosis is poor.   Patient will benefit from inpatient hospice
stage 4 ampullary cancer- not a surgical candidate .  Overall prognosis is poor.   Patient will benefit from inpatient hospice
Stage 4, is on [facility] hospice, pt wants to remain on hospice and agrees to go to [place] in hospice but prefers [place] location. Referral made by SW today  Patient c/o right shoulder and neck pain, suggest dilaudid 0.5 mg q4 prn iv, patient says he wants to avoid taking pain medication during the day and wants to use it during the night so it will also help him sleep

## 2017-06-09 NOTE — PROGRESS NOTE ADULT - ATTENDING COMMENTS
Discharged to inpatient hospice.  time spent to coordinate discharge- 45 minutes.
Plan discussed with NP staff.  Awaiting in patient hospice bed.

## 2017-06-09 NOTE — PROGRESS NOTE ADULT - PROBLEM SELECTOR PLAN 2
Patient presented with elevated WBC and hx of continued sepsis/bacteremia, was on ciprofloxacin at home but missed several doses. No blood work done as patient is a hospice candidate  On Zosyn per ID recs  Bcx negative  ID Dr Dent
due to progression of illness and multiorgan failure  Continue comfort measures
most likely secondary to cholangitis,   continue zosyn   ID input appreciated
multifactorial: sepsis and decreased oral intake, iv fluids had been stopped   Maintain comfort measures
Elevated WBC and hx of continued sepsis/bacteremia, was on ciprofloxacin at home but missed several doses  c/w levaquin for now  follow up BCx   ID Dr Dent consulted for duration of antibiotics

## 2017-06-09 NOTE — PROGRESS NOTE ADULT - SUBJECTIVE AND OBJECTIVE BOX
Meds:  piperacillin/tazobactam IVPB. 3.375Gram(s) IV Intermittent every 12 hours    Allergies:  Allergies    No Known Allergies    Intolerances      ROS  [  ] UNABLE TO ELICIT    General:  [ x ] Poor appetite  [  ] Fever  [  ] Chills  [ x ] Malaise    Skin:  [ x ] None [  ] Rash  [  ] Wound  [  ] Ulcer    HEENT:  [ x ] None  [  ] Sore Throat  [  ] Nasal congestion/ runny nose  [  ] Photophobia [  ] Neck pain      Chest:  [  ] None   [ x ] SOB on exertion.  [ x ] Cough  [  ] None    Cardiovascular:   [ x ] None  [  ] CP  [  ] Palpitation    Gastrointestinal:  [  ] None  [ x ] Abd pain   [  ] Nausea    [  ] Vomiting   [  ] Diarrhea	     Genitourinary:  [ x ] None [  ] Polyuria   [  ] Urgency  [  ] Frequency  [  ] Dysuria    [  ]  Hematuria       Musculoskeletal:  [  ] None [ x ] Back Pain and R shoulder pain	[ x ] Body aches  [  ] Joint pain    Neurological: [  ] None [  ]Dizziness  [  ]Visual Disturbance  [  ]Headaches   [ x ] Weakness          PHYSICAL EXAM:    Vital Signs Last 24 Hrs  T(C): 35, Max: 36.4 (06-08 @ 14:26)  T(F): 95, Max: 97.6 (06-08 @ 14:26)  HR: 95 (88 - 95)  BP: 91/61 (86/61 - 93/64)  BP(mean): --  RR: 16 (16 - 18)  SpO2: 96% (93% - 97%)    Constitutional: Feels better.    HEENT: [ x ] Wnl  [  ] Pharyngeal congestion    Neck:  [ x ] Supple  [  ]Lymphadenopathy  [ x ] No JVD   [  ] JVD  [  ] Masses   [x  ] WNL    CHEST/Respiratory:  [  ]Clear to auscultation  [ x ] Rales   [  ] Rhonchi   [  ] Wheezing     [  ] Chest Tenderness      Cardiovascular:  [ x ] Reg S1 S2   [  ] Irreg S1 S2   [ x ]No Murmur  [  ] +ve Murmurs  [  ]Systolic [  ]Diastolic      Abdomen:  [ x ] Soft  [  ] No tendrerness  [ x ] Tenderness RUQ [  ] Organomegaly  [  ] ABD Distention  [  ] Rigidity                       [ x ] No Regidity                       [ x ] No Rebound Tenderness  [ x ] No Guarding Rigidity  [  ] Rebound Tenderness[  ] Guarding Rigidity                          [ x ]  +ve Bowel Sounds  [  ] Decreased Bowel Sounds    [  ] Absent Bowel Sounds                            Extremities: [  ] No edema [ x ] Edema  [  ] Clubbing   [  ] Cyanosis                         [ x ] No Tender Calf muscles  [  ] Tender Calf muscles                        [ x ] Palpable peripheral pulses  Neurological: [ x ] Awake  [ x ] Alert  [ x ] Oriented  x  3                           [  ] Confused  [  ] Drowzy  [  ] repond to painful stimuli  [  ] Unresponsive    Skin:  [ x ] Intact [ x ] Jaundice [  ] Thrombophlebitis  [  ] Rashes  [  ] Dry  [  ] Ulcers    Ortho:  [  ] Joint Swelling  [  ] Joint erythema [  ] Joint tenderness                [  ] Increased temp. to touch  [ x ] DJD      LABS/DIAGNOSTIC TESTS                          15.0   24.1  )-----------( 68       ( 05 Jun 2017 13:19 )             44.8         06-05    127<L>  |  93<L>  |  78<H>  ----------------------------<  109<H>  5.5<H>   |  17<L>  |  2.70<H>    Ca    8.2<L>      05 Jun 2017 13:19    TPro  5.6<L>  /  Alb  2.3<L>  /  TBili  11.2<H>  /  DBili  x   /  AST  221<H>  /  ALT  102<H>  /  AlkPhos  >1000<H>  06-05      LIVER FUNCTIONS - ( 05 Jun 2017 13:19 )  Alb: 2.3 g/dL / Pro: 5.6 g/dL / ALK PHOS: >1000 U/L / ALT: 102 U/L DA / AST: 221 U/L / GGT: x               CULTURES:   Culture - Urine (06.06.17 @ 09:50)    Specimen Source: .Urine Clean Catch (Midstream)    Culture Results:   <10,000 CFU/ml  Normal Urogenital corrina present    Culture - Blood (06.05.17 @ 18:08)    Specimen Source: .Blood Blood-Peripheral    Culture Results:   No growth to date.    Culture - Blood (06.05.17 @ 18:08)    Specimen Source: .Blood Blood-Peripheral    Culture Results:   No growth to date.        RADIOLOGY  CXR:   EXAM:  CHEST SINGLE AP OR PA                            PROCEDURE DATE:  06/05/2017        INTERPRETATION:  INDICATION: Pneumonia    PRIORS: December 2, 2013    VIEWS: Portable AP radiography of the chest performed. Evaluation limited   secondary to shallow inspiration.    FINDINGS: A right-sided IVAD is identified, the distal tip of which   overlies the expected region of the SVC/right atrium. Heart size appears   within normal limits. No superior mediastinal widening is identified.   There are innumerable nodular type opacities identified within the   bilateral lung parenchyma. There is no evidence for right-sided pleural   effusion. A small left pleural effusion is suggested. There is no   evidence for pneumothorax. No mediastinal shift is noted.    IMPRESSION: There are innumerable nodular type opacities identified   within the bilateral lung parenchyma. Differential considerations would   include infectious, inflammatory and neoplastic disease. Correlate   clinically. Radiographic follow-up is advised.      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  Work up suggest with history and signs for acute cholecystitis, pneumonia and DVT.  Blood cultures are still negative.  Patient tolerates Zosyn well and feels better, but WBC, electrolytes, and liver enzymes were not repeated yet .    Problem/Recommendation - 1:  Problem: Cholecystitis.   Recommendation:   1- Continue Zosyn 3.375 gm IVPB q 12 hors x 2 weeks total(till6/19/17).  2- S/P Vancomycin 1 gm IVPB x 1 dose on 6/6/17 and will give it again if patient is not improving.  3- Follow Blood cultures till final report.  4- Follow Urine culture till final report.  5- Surgical evaluation.  6- Fluid and electrolytes management.  7- CBC and CMP follow up.    Problem/Recommendation - 2:  ·  Problem: Pneumonia, bacterial.    Recommendation:   1- Start Zosyn 3.375 gm IVPB q 12 hors x 2 weeks.  2- S/P Vancomycin 1 gm IVPB x 1 dose on 6/6/17.  3- Blood cultures.  4- Urine culture.  5- Surgical evaluation.  6- Fluid and electrolytes management.  7- CBC and CMP follow up.     Problem/Recommendation - 3:  ·  Problem: Sepsis.    Recommendation:   1- Start Zosyn 3.375 gm IVPB q 12 hors x 2 weeks.  2- S/P Vancomycin 1 gm IVPB x 1 dose on 6/6/17 .  3- Blood cultures.  4- Urine culture.  5- Surgical evaluation.  6- Fluid and electrolytes management and follow up.  7- CBC and CMP follow up and management.     Problem/Recommendation - 4:  ·  Problem: Renal failure.    Recommendation:   1- Fluid and electrolytes management.  2- CBC and CMP follow up.     Problem/Recommendation - 5:  ·  Problem: Ampullary carcinoma.    Recommendation:   1- Fluid and electrolytes management and follow up.  2- CBC and CMP follow up.  3- Pain management.  4- Oncology management. Meds:  piperacillin/tazobactam IVPB. 3.375Gram(s) IV Intermittent every 12 hours    Allergies:  Allergies    No Known Allergies    Intolerances      ROS  [  ] UNABLE TO ELICIT    General:  [ x ] Poor appetite  [  ] Fever  [  ] Chills  [ x ] Malaise    Skin:  [ x ] None [  ] Rash  [  ] Wound  [  ] Ulcer    HEENT:  [ x ] None  [  ] Sore Throat  [  ] Nasal congestion/ runny nose  [  ] Photophobia [  ] Neck pain      Chest:  [  ] None   [ x ] SOB on exertion.  [ x ] Cough  [  ] None    Cardiovascular:   [ x ] None  [  ] CP  [  ] Palpitation    Gastrointestinal:  [  ] None  [ x ] Abd pain   [  ] Nausea    [  ] Vomiting   [  ] Diarrhea	     Genitourinary:  [ x ] None [  ] Polyuria   [  ] Urgency  [  ] Frequency  [  ] Dysuria    [  ]  Hematuria       Musculoskeletal:  [  ] None [ x ] Back Pain and R shoulder pain	[ x ] Body aches  [  ] Joint pain    Neurological: [  ] None [  ]Dizziness  [  ]Visual Disturbance  [  ]Headaches   [ x ] Weakness          PHYSICAL EXAM:    Vital Signs Last 24 Hrs  T(C): 35, Max: 36.4 (06-08 @ 14:26)  T(F): 95, Max: 97.6 (06-08 @ 14:26)  HR: 95 (88 - 95)  BP: 91/61 (86/61 - 93/64)  BP(mean): --  RR: 16 (16 - 18)  SpO2: 96% (93% - 97%)    Constitutional: Feels better.    HEENT: [ x ] Wnl  [  ] Pharyngeal congestion    Neck:  [ x ] Supple  [  ]Lymphadenopathy  [ x ] No JVD   [  ] JVD  [  ] Masses   [x  ] WNL    CHEST/Respiratory:  [  ]Clear to auscultation  [ x ] Rales   [  ] Rhonchi   [  ] Wheezing     [  ] Chest Tenderness      Cardiovascular:  [ x ] Reg S1 S2   [  ] Irreg S1 S2   [ x ]No Murmur  [  ] +ve Murmurs  [  ]Systolic [  ]Diastolic      Abdomen:  [ x ] Soft  [  ] No tendrerness  [ x ] Tenderness RUQ [  ] Organomegaly  [  ] ABD Distention  [  ] Rigidity                       [ x ] No Regidity                       [ x ] No Rebound Tenderness  [ x ] No Guarding Rigidity  [  ] Rebound Tenderness[  ] Guarding Rigidity                          [ x ]  +ve Bowel Sounds  [  ] Decreased Bowel Sounds    [  ] Absent Bowel Sounds                            Extremities: [  ] No edema [ x ] Edema  [  ] Clubbing   [  ] Cyanosis                         [ x ] No Tender Calf muscles  [  ] Tender Calf muscles                        [ x ] Palpable peripheral pulses  Neurological: [ x ] Awake  [ x ] Alert  [ x ] Oriented  x  3                           [  ] Confused  [  ] Drowzy  [  ] repond to painful stimuli  [  ] Unresponsive    Skin:  [ x ] Intact [ x ] Jaundice [  ] Thrombophlebitis  [  ] Rashes  [  ] Dry  [  ] Ulcers    Ortho:  [  ] Joint Swelling  [  ] Joint erythema [  ] Joint tenderness                [  ] Increased temp. to touch  [ x ] DJD      Last LABS/DIAGNOSTIC TESTS                          15.0   24.1  )-----------( 68       ( 05 Jun 2017 13:19 )             44.8         06-05    127<L>  |  93<L>  |  78<H>  ----------------------------<  109<H>  5.5<H>   |  17<L>  |  2.70<H>    Ca    8.2<L>      05 Jun 2017 13:19    TPro  5.6<L>  /  Alb  2.3<L>  /  TBili  11.2<H>  /  DBili  x   /  AST  221<H>  /  ALT  102<H>  /  AlkPhos  >1000<H>  06-05      LIVER FUNCTIONS - ( 05 Jun 2017 13:19 )  Alb: 2.3 g/dL / Pro: 5.6 g/dL / ALK PHOS: >1000 U/L / ALT: 102 U/L DA / AST: 221 U/L / GGT: x               CULTURES:   Culture - Urine (06.06.17 @ 09:50)    Specimen Source: .Urine Clean Catch (Midstream)    Culture Results:   <10,000 CFU/ml  Normal Urogenital corrina present    Culture - Blood (06.05.17 @ 18:08)    Specimen Source: .Blood Blood-Peripheral    Culture Results:   No growth to date.    Culture - Blood (06.05.17 @ 18:08)    Specimen Source: .Blood Blood-Peripheral    Culture Results:   No growth to date.        RADIOLOGY  Last CXR:   EXAM:  CHEST SINGLE AP OR PA                            PROCEDURE DATE:  06/05/2017        INTERPRETATION:  INDICATION: Pneumonia    PRIORS: December 2, 2013    VIEWS: Portable AP radiography of the chest performed. Evaluation limited   secondary to shallow inspiration.    FINDINGS: A right-sided IVAD is identified, the distal tip of which   overlies the expected region of the SVC/right atrium. Heart size appears   within normal limits. No superior mediastinal widening is identified.   There are innumerable nodular type opacities identified within the   bilateral lung parenchyma. There is no evidence for right-sided pleural   effusion. A small left pleural effusion is suggested. There is no   evidence for pneumothorax. No mediastinal shift is noted.    IMPRESSION: There are innumerable nodular type opacities identified   within the bilateral lung parenchyma. Differential considerations would   include infectious, inflammatory and neoplastic disease. Correlate   clinically. Radiographic follow-up is advised.      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  Work up suggest with history and signs for acute cholecystitis, pneumonia and DVT.  Blood cultures are still negative.  Patient tolerates Zosyn well and feels better, but WBC, electrolytes, and liver enzymes were not repeated yet .    Problem/Recommendation - 1:  Problem: Cholecystitis.   Recommendation:   1- Continue Zosyn 3.375 gm IVPB q 12 hors x 2 weeks total(till6/19/17).  2- S/P Vancomycin 1 gm IVPB x 1 dose on 6/6/17 and will give it again if patient is not improving.  3- Follow Blood cultures till final report.  4- Follow Urine culture till final report.  5- Surgical evaluation.  6- Fluid and electrolytes management.  7- CBC and CMP follow up.    Problem/Recommendation - 2:  ·  Problem: Pneumonia, bacterial.    Recommendation:   1- Continue Zosyn 3.375 gm IVPB q 12 hors x 2 weeks.  2- S/P Vancomycin 1 gm IVPB x 1 dose on 6/6/17.  3- Blood cultures.  4- Urine culture.  5- Surgical evaluation.  6- Fluid and electrolytes management.  7- CBC and CMP follow up.     Problem/Recommendation - 3:  ·  Problem: Sepsis.    Recommendation:   1- Continue Zosyn 3.375 gm IVPB q 12 hors x 2 weeks.  2- S/P Vancomycin 1 gm IVPB x 1 dose on 6/6/17 .  3- Blood cultures.  4- Urine culture.  5- Surgical evaluation.  6- Fluid and electrolytes management and follow up.  7- CBC and CMP follow up and management.     Problem/Recommendation - 4:  ·  Problem: Renal failure.    Recommendation:   1- Fluid and electrolytes management.  2- CBC and CMP follow up.     Problem/Recommendation - 5:  ·  Problem: Ampullary carcinoma.    Recommendation:   1- Fluid and electrolytes management and follow up.  2- CBC and CMP follow up.  3- Pain management.  4- Oncology management.

## 2017-06-10 LAB
CULTURE RESULTS: SIGNIFICANT CHANGE UP
CULTURE RESULTS: SIGNIFICANT CHANGE UP
SPECIMEN SOURCE: SIGNIFICANT CHANGE UP
SPECIMEN SOURCE: SIGNIFICANT CHANGE UP

## 2017-06-14 DIAGNOSIS — K21.9 GASTRO-ESOPHAGEAL REFLUX DISEASE WITHOUT ESOPHAGITIS: ICD-10-CM

## 2017-06-14 DIAGNOSIS — I82.412 ACUTE EMBOLISM AND THROMBOSIS OF LEFT FEMORAL VEIN: ICD-10-CM

## 2017-06-14 DIAGNOSIS — C24.1 MALIGNANT NEOPLASM OF AMPULLA OF VATER: ICD-10-CM

## 2017-06-14 DIAGNOSIS — Z51.5 ENCOUNTER FOR PALLIATIVE CARE: ICD-10-CM

## 2017-06-14 DIAGNOSIS — J18.9 PNEUMONIA, UNSPECIFIED ORGANISM: ICD-10-CM

## 2017-06-14 DIAGNOSIS — N18.4 CHRONIC KIDNEY DISEASE, STAGE 4 (SEVERE): ICD-10-CM

## 2017-06-14 DIAGNOSIS — K81.9 CHOLECYSTITIS, UNSPECIFIED: ICD-10-CM

## 2017-06-14 DIAGNOSIS — G47.00 INSOMNIA, UNSPECIFIED: ICD-10-CM

## 2017-06-14 DIAGNOSIS — E87.1 HYPO-OSMOLALITY AND HYPONATREMIA: ICD-10-CM

## 2017-06-14 DIAGNOSIS — I95.9 HYPOTENSION, UNSPECIFIED: ICD-10-CM

## 2017-06-14 DIAGNOSIS — D72.829 ELEVATED WHITE BLOOD CELL COUNT, UNSPECIFIED: ICD-10-CM

## 2017-06-14 DIAGNOSIS — A41.9 SEPSIS, UNSPECIFIED ORGANISM: ICD-10-CM

## 2017-06-14 DIAGNOSIS — E87.2 ACIDOSIS: ICD-10-CM

## 2017-06-14 DIAGNOSIS — K83.0 CHOLANGITIS: ICD-10-CM

## 2017-06-15 DIAGNOSIS — K72.90 HEPATIC FAILURE, UNSPECIFIED WITHOUT COMA: ICD-10-CM

## 2017-06-15 DIAGNOSIS — E43 UNSPECIFIED SEVERE PROTEIN-CALORIE MALNUTRITION: ICD-10-CM

## 2017-06-15 DIAGNOSIS — C78.7 SECONDARY MALIGNANT NEOPLASM OF LIVER AND INTRAHEPATIC BILE DUCT: ICD-10-CM

## 2017-06-15 DIAGNOSIS — R64 CACHEXIA: ICD-10-CM

## 2017-06-15 DIAGNOSIS — Z74.01 BED CONFINEMENT STATUS: ICD-10-CM

## 2024-04-22 NOTE — PATIENT PROFILE ADULT. - CAREGIVER NAME
PATIENTS WIFE CALLED IN STATING HE HAS BEEN HAVING UPPER BACK PAIN AROUND NECK BETWEEN SHOULDER BLADES HAS BEEN GOING ON FOR ABOUT 3 MONTHS     REALLY BAD THE PAST 2 DAYS     PATIENT STATES NO TO TAKING ANYTHING FOR PAIN.      WAS WONDERING ABOUT GETTING AN XRAY DONE          catrina mobley